# Patient Record
Sex: MALE | Race: WHITE | Employment: FULL TIME | URBAN - METROPOLITAN AREA
[De-identification: names, ages, dates, MRNs, and addresses within clinical notes are randomized per-mention and may not be internally consistent; named-entity substitution may affect disease eponyms.]

---

## 2020-12-09 RX ORDER — ATROPINE SULFATE 0.1 MG/ML
0.5 INJECTION INTRAVENOUS
Status: CANCELLED | OUTPATIENT
Start: 2020-12-09 | End: 2020-12-10

## 2020-12-09 RX ORDER — SODIUM CHLORIDE 0.9 % (FLUSH) 0.9 %
5-40 SYRINGE (ML) INJECTION AS NEEDED
Status: CANCELLED | OUTPATIENT
Start: 2020-12-09

## 2020-12-09 RX ORDER — DEXTROMETHORPHAN/PSEUDOEPHED 2.5-7.5/.8
1.2 DROPS ORAL
Status: CANCELLED | OUTPATIENT
Start: 2020-12-09

## 2020-12-09 RX ORDER — DIPHENHYDRAMINE HYDROCHLORIDE 50 MG/ML
50 INJECTION, SOLUTION INTRAMUSCULAR; INTRAVENOUS ONCE
Status: CANCELLED | OUTPATIENT
Start: 2020-12-09 | End: 2020-12-09

## 2020-12-09 RX ORDER — SODIUM CHLORIDE 0.9 % (FLUSH) 0.9 %
5-40 SYRINGE (ML) INJECTION EVERY 8 HOURS
Status: CANCELLED | OUTPATIENT
Start: 2020-12-09

## 2020-12-09 RX ORDER — EPINEPHRINE 0.1 MG/ML
1 INJECTION INTRACARDIAC; INTRAVENOUS
Status: CANCELLED | OUTPATIENT
Start: 2020-12-09 | End: 2020-12-10

## 2020-12-11 ENCOUNTER — HOSPITAL ENCOUNTER (OUTPATIENT)
Dept: PREADMISSION TESTING | Age: 55
Discharge: HOME | End: 2020-12-11
Payer: OTHER GOVERNMENT

## 2020-12-11 PROCEDURE — 87635 SARS-COV-2 COVID-19 AMP PRB: CPT

## 2020-12-13 LAB — SARS-COV-2, COV2NT: NOT DETECTED

## 2020-12-15 ENCOUNTER — HOSPITAL ENCOUNTER (OUTPATIENT)
Age: 55
Setting detail: OUTPATIENT SURGERY
Discharge: HOME | End: 2020-12-15
Attending: INTERNAL MEDICINE | Admitting: INTERNAL MEDICINE
Payer: OTHER GOVERNMENT

## 2020-12-15 VITALS
TEMPERATURE: 97.7 F | HEIGHT: 75 IN | SYSTOLIC BLOOD PRESSURE: 120 MMHG | RESPIRATION RATE: 16 BRPM | HEART RATE: 63 BPM | WEIGHT: 256.7 LBS | OXYGEN SATURATION: 97 % | BODY MASS INDEX: 31.92 KG/M2 | DIASTOLIC BLOOD PRESSURE: 94 MMHG

## 2020-12-15 PROCEDURE — 77030013991 HC SNR POLYP ENDOSC BSC -A: Performed by: INTERNAL MEDICINE

## 2020-12-15 PROCEDURE — 74011250636 HC RX REV CODE- 250/636: Performed by: INTERNAL MEDICINE

## 2020-12-15 PROCEDURE — 76040000008: Performed by: INTERNAL MEDICINE

## 2020-12-15 PROCEDURE — G0500 MOD SEDAT ENDO SERVICE >5YRS: HCPCS | Performed by: INTERNAL MEDICINE

## 2020-12-15 PROCEDURE — 77030040361 HC SLV COMPR DVT MDII -B: Performed by: INTERNAL MEDICINE

## 2020-12-15 PROCEDURE — 99153 MOD SED SAME PHYS/QHP EA: CPT | Performed by: INTERNAL MEDICINE

## 2020-12-15 PROCEDURE — 88305 TISSUE EXAM BY PATHOLOGIST: CPT

## 2020-12-15 PROCEDURE — 2709999900 HC NON-CHARGEABLE SUPPLY: Performed by: INTERNAL MEDICINE

## 2020-12-15 PROCEDURE — 77030039961 HC KT CUST COLON BSC -D: Performed by: INTERNAL MEDICINE

## 2020-12-15 RX ORDER — FLUMAZENIL 0.1 MG/ML
0.2 INJECTION INTRAVENOUS
Status: ACTIVE | OUTPATIENT
Start: 2020-12-15 | End: 2020-12-15

## 2020-12-15 RX ORDER — FENTANYL CITRATE 50 UG/ML
100 INJECTION, SOLUTION INTRAMUSCULAR; INTRAVENOUS
Status: ACTIVE | OUTPATIENT
Start: 2020-12-15 | End: 2020-12-15

## 2020-12-15 RX ORDER — DICLOFENAC SODIUM 10 MG/G
GEL TOPICAL 4 TIMES DAILY
COMMUNITY
End: 2021-11-24

## 2020-12-15 RX ORDER — SODIUM CHLORIDE 9 MG/ML
1000 INJECTION, SOLUTION INTRAVENOUS CONTINUOUS
Status: DISPENSED | OUTPATIENT
Start: 2020-12-15 | End: 2020-12-15

## 2020-12-15 RX ORDER — NALOXONE HYDROCHLORIDE 0.4 MG/ML
0.4 INJECTION, SOLUTION INTRAMUSCULAR; INTRAVENOUS; SUBCUTANEOUS
Status: ACTIVE | OUTPATIENT
Start: 2020-12-15 | End: 2020-12-15

## 2020-12-15 RX ORDER — FEXOFENADINE HCL AND PSEUDOEPHEDRINE HCI 60; 120 MG/1; MG/1
1 TABLET, EXTENDED RELEASE ORAL
COMMUNITY

## 2020-12-15 RX ORDER — IBUPROFEN 200 MG
TABLET ORAL
COMMUNITY
End: 2021-11-24

## 2020-12-15 RX ORDER — OMEPRAZOLE 20 MG/1
20 TABLET, DELAYED RELEASE ORAL
COMMUNITY

## 2020-12-15 RX ORDER — MIDAZOLAM HYDROCHLORIDE 1 MG/ML
.25-5 INJECTION, SOLUTION INTRAMUSCULAR; INTRAVENOUS
Status: ACTIVE | OUTPATIENT
Start: 2020-12-15 | End: 2020-12-15

## 2020-12-15 RX ADMIN — SODIUM CHLORIDE 1000 ML: 900 INJECTION, SOLUTION INTRAVENOUS at 11:12

## 2020-12-15 NOTE — H&P
Assessment/Plan  # Detail Type Description    1. Assessment Encounter for screening for cancer of colon (Z12.11). Patient Plan First colonoscopy ordered with Dr. Jose Menendez, with [facility]. Stressed importance of following all bowel preparation instructions. Explained the procedure to the patient including all risks and benefits. These risks consist of missed lesions on exam, bleeding, and bowel perforation with possible need for admission to the hospital, and in the most extensive of  circumstances, the patient may require surgery. Plan Orders Further diagnostic evaluations ordered today include(s) DIAGNOSTIC COLONOSCOPY to be performed. 2. Assessment Family hx of colonic polyps (Z83.71). Patient Plan Father had history of colon polyps. This 48year old male presents for Colon Cancer Screening. History of Present Illness:  1. Colon Cancer Screening   No prior screening. Denies risk factors. Pertinent negatives include abdominal pain, change in bowel habits, change in stool caliber, constipation, decreased appetite, diarrhea, melena, nausea, rectal bleeding, vomiting, weight gain and weight loss. Additional information: No family history of colon cancer and BM 2x daily. Father had history of colon polyps. PROBLEM LIST:     Problem Description Onset Date Chronic Clinical Status Notes   Current tear of medial cartilage AND/OR meniscus of knee 08/28/2015 N     Knee pain 08/28/2015 N     Osteoarthritis of knee 09/25/2013 Y  b/l knee OA s/p steroid and synvisc injections.              PAST MEDICAL/SURGICAL HISTORY   (Detailed)    Disease/disorder Onset Date Management Date Comments     LASIK 8796    R-knee plica removed  Knee surgery     TMJ - Temporomandibular joint disorder       BPH - benign prostatic hyperplasia       Elevated PSA       Lumbar radiculopathy       LBP - Low back pain       OA - Osteoarthritis       GERD       Family history of colonic polyps Family History  (Detailed)  Relationship Family Member Name  Age at Death Condition Onset Age Cause of Death       Family history of Hypertension  N         N       Family history of cancer, skin  N   Father    Lymphoma  N   Father    Colon polyps  N   Father    Stroke  N   Mother    Diabetes mellitus  N         Social History:  (Detailed)  Tobacco use reviewed. Preferred language is [place]. MARITAL STATUS/FAMILY/SOCIAL SUPPORT  Marital status:    CHILDREN  Does not have children. Smoking status: Never smoker. TOBACCO SCREENING:  Patient has never used tobacco. Patient has not used tobacco in the last 30 days. Patient has not used smokeless tobacco in the last 30 days. SMOKING STATUS  Type Smoking Status Usage Per Day Years Used Pack Years Total Pack Years    Never smoker         VAPING USE  Screened for vaping? No    TOBACCO/VAPING EXPOSURE  No passive smoke exposure. ALCOHOL  There is a history of alcohol use. Type: Beer. consumed rarely. CAFFEINE  The patient uses caffeine: soda - 1-2 cups a day. LIFESTYLE  Moderate activity level. Health club member. Exercise includes weights. Exercises 3-4 times a week. DIET  , high cholesterol. SLEEP PATTERNS  Patient has no changes to sleep patterns. Medications (active prior to today)  Medication Name Sig Description Start Date Stop Date Refilled Rx Elsewhere   multivitamin capsule take 1 capsule by oral route  every day 2013   N   omeprazole 40 mg capsule,delayed release take 1 capsule by oral route  every day before a meal as needed 2019   N   Voltaren 1 % topical gel apply (2G)  by topical route 4 times every day to the affected area(s) as needed 2019   N   ibuprofen 800 mg tablet take 1 tablet by oral route 3 times every day with food as needed 2019   N     Patient Status   Completed with information received for patient in a summary of care record.      Medication Reconciliation  Medications reconciled today. Medication Reviewed  Adherence Medication Name Sig Desc Elsewhere Status   taking as directed multivitamin capsule take 1 capsule by oral route  every day N Verified   taking as directed omeprazole 40 mg capsule,delayed release take 1 capsule by oral route  every day before a meal as needed N Verified   taking as directed Voltaren 1 % topical gel apply (2G)  by topical route 4 times every day to the affected area(s) as needed N Verified   taking as directed ibuprofen 800 mg tablet take 1 tablet by oral route 3 times every day with food as needed N Verified     Medications (Added, Continued or Stopped today)  Start Date Medication Directions PRN Status PRN Reason Instruction Stop Date   01/23/2019 ibuprofen 800 mg tablet take 1 tablet by oral route 3 times every day with food as needed Y      12/02/2020 MoviPrep 100 gram-7.5 gram-2.691 gram oral powder packet take by oral route as directed per package instructions N      02/14/2013 multivitamin capsule take 1 capsule by oral route  every day N      01/23/2019 omeprazole 40 mg capsule,delayed release take 1 capsule by oral route  every day before a meal as needed Y      01/23/2019 Voltaren 1 % topical gel apply (2G)  by topical route 4 times every day to the affected area(s) as needed Y        Allergies:  Ingredient Reaction (Severity) Medication Name Comment   NO KNOWN ALLERGIES      Reviewed, no changes. ORDERS:  Status Lab Order Time Frame Comments   ordered DIAGNOSTIC COLONOSCOPY       Review of System  System Neg/Pos Details   Constitutional Negative Fever, Weight gain and Weight loss. ENMT Negative Sinus Infection. Eyes Negative Double vision. Respiratory Negative Asthma, Chronic cough and Dyspnea. Cardio Negative Chest pain, Edema and Irregular heartbeat/palpitations.    GI Negative Abdominal pain, Change in bowel habits, Change in stool caliber, Constipation, Decreased appetite, Diarrhea, Dysphagia, Heartburn, Hematemesis, Hematochezia, Melena, Nausea, Rectal bleeding, Reflux and Vomiting.  Negative Dysuria and Hematuria. Endocrine Negative Cold intolerance and Heat intolerance. Neuro Negative Dizziness, Headache, Numbness and Tremors. Psych Negative Anxiety, Depression and Increased stress. Integumentary Negative Hives, Pruritus and Rash. MS Negative Back pain, Joint pain and Myalgia. Hema/Lymph Negative Easy bleeding, Easy bruising and Lymphadenopathy. Allergic/Immuno Negative Food allergies and Immunosuppression. Vital Signs   Height  Time ft in cm Last Measured Height Position   11:18 AM 6.0 3.00 190.50 12/02/2020      MAP (Calculated)  Arterial Line 1 BP (mmHg)  BP Patient Position  Resp  SpO2  O2 Device  O2 Flow Rate (L/min)  Pre/Post Ductal  Weight        12/15/20 1220  --  64  148/91Abnormal    110  --  --  18  99 %  Room air  --  --  --    12/15/20 1106  98.2 °F (36.8 °C)  70  139/91Abnormal    107  --  --  18  99 %  Room air  --  --  116.4 kg (256 lb 11.2 oz)        PHYSICAL EXAM:  Exam Findings Details   Constitutional Normal Well developed. Eyes Normal Conjunctiva - Right: Normal, Left: Normal. Sclera - Right: Normal, Left: Normal.   Nasopharynx Normal Lips/teeth/gums - Normal.   Neck Exam Normal Inspection - Normal.   Respiratory Normal Inspection - Normal.   Cardiovascular Normal Regular rate and rhythm. No murmurs, gallops, or rubs. Vascular Normal Pulses - Brachial: Normal.   Abdomen Normal Inspection - Normal. Appliance(s) - None. Auscultation - Normal. Percussion - Normal. Anterior palpation - No guarding. No abdominal tenderness. No hepatic enlargement. No hernia. No ascites. Skin Normal Inspection - Normal.   Musculoskeletal Normal Hands/Wrist - Right: Normal, Left: Normal.   Extremity Normal No edema. Neurological Normal Fine motor skills - Normal.   Psychiatric Normal Orientation - Oriented to time, place, person & situation. Appropriate mood and affect. Active Patient Care Team Members    Name Contact Agency Type Support Role Relationship Active Date Inactive Date Specialty   Ifeanyichukwu Ani   encounter provider    Urology   Darcy Herrera   Patient provider PCP   Internal Medicine   Malu James   Emergency Contact Spouse        No change in H&P

## 2020-12-15 NOTE — DISCHARGE INSTRUCTIONS
Huang Clay  [id number]  1965    COLON DISCHARGE INSTRUCTIONS    Discomfort:  Redness at IV site- apply warm compress to area; if redness or soreness persist- contact your physician  There may be a slight amount of blood passed from the rectum  Gaseous discomfort- walking, belching will help relieve any discomfort  You may not operate a vehicle til the next day. You may not engage in an occupation involving machinery or appliances til the next day. You may not drink alcoholic beverages til the next day. DIET:   High fiber diet. ACTIVITY:  You may not  resume your normal daily activities til the next day. it is recommended that you spend the remainder of the day resting -  avoid any strenuous activity. CALL M.D.  IF ANY SIGN OF:   Increasing pain, nausea, vomiting  Abdominal distension (swelling)  New increased bleeding (oral or rectal)  Fever (chills)  Pain in chest area  Bloody discharge from nose or mouth  Shortness of breath    You may not  take any Advil, Aspirin, Ibuprofen, Motrin, Aleve, or Goodys for 7 days, ONLY  Tylenol as needed for pain. Post procedure diagnosis:  HEMORROIDS; POLYP    Follow-up Instructions: Your follow up colonoscopy will be in 7 years. We will notify you the results of your biopsy by letter within 2 weeks. Lyubov Toscano MD  December 15, 2020       DISCHARGE SUMMARY from Nurse    The following personal items collected during your admission are returned to you:   Dental Appliance: Dental Appliances: None  Vision: Visual Aid:  At bedside  Hearing Aid:    Jewelry:    Clothing:    Other Valuables:    Valuables sent to safe:              PATIENT INSTRUCTIONS:    After general anesthesia or intravenous sedation, for 24 hours or while taking prescription Narcotics:  · Limit your activities  · Do not drive and operate hazardous machinery  · Do not make important personal or business decisions  · Do  not drink alcoholic beverages  · If you have not urinated within 8 hours after discharge, please contact your surgeon on call. Report the following to your surgeon:  · Excessive pain, swelling, redness or odor of or around the surgical area  · Temperature over 100.5  · Nausea and vomiting lasting longer than 4 hours or if unable to take medications  · Any signs of decreased circulation or nerve impairment to extremity: change in color, persistent  numbness, tingling, coldness or increase pain  · Any questions      No orders of the defined types were placed in this encounter. What to do at Home:  Recommended activity: as above,     If you experience any of the following symptoms as above, please follow up with Dr. Nelia Reed. *  Please give a list of your current medications to your Primary Care Provider. *  Please update this list whenever your medications are discontinued, doses are      changed, or new medications (including over-the-counter products) are added. *  Please carry medication information at all times in case of emergency situations. These are general instructions for a healthy lifestyle:    No smoking/ No tobacco products/ Avoid exposure to second hand smoke    Surgeon General's Warning:  Quitting smoking now greatly reduces serious risk to your health. Obesity, smoking, and sedentary lifestyle greatly increases your risk for illness    A healthy diet, regular physical exercise & weight monitoring are important for maintaining a healthy lifestyle    You may be retaining fluid if you have a history of heart failure or if you experience any of the following symptoms:  Weight gain of 3 pounds or more overnight or 5 pounds in a week, increased swelling in our hands or feet or shortness of breath while lying flat in bed. Please call your doctor as soon as you notice any of these symptoms; do not wait until your next office visit.     Recognize signs and symptoms of STROKE:    F-face looks uneven    A-arms unable to move or move unevenly    S-speech slurred or non-existent    T-time-call 911 as soon as signs and symptoms begin-DO NOT go       Back to bed or wait to see if you get better-TIME IS BRAIN. The discharge information has been reviewed with the patient and parent. The patient and parent verbalized understanding. Warning Signs of HEART ATTACK     Call 911 if you have these symptoms:   Chest discomfort. Most heart attacks involve discomfort in the center of the chest that lasts more than a few minutes, or that goes away and comes back. It can feel like uncomfortable pressure, squeezing, fullness, or pain.  Discomfort in other areas of the upper body. Symptoms can include pain or discomfort in one or both arms, the back, neck, jaw, or stomach.  Shortness of breath with or without chest discomfort.  Other signs may include breaking out in a cold sweat, nausea, or lightheadedness. Don't wait more than five minutes to call 911 - MINUTES MATTER! Fast action can save your life. Calling 911 is almost always the fastest way to get lifesaving treatment. Emergency Medical Services staff can begin treatment when they arrive -- up to an hour sooner than if someone gets to the hospital by car. The discharge information has been reviewed with the patient and caregiver. The patient and caregiver verbalized understanding. Discharge medications reviewed with the patient and guardian and appropriate educational materials and side effects teaching were provided.     Patient armband removed and shredded

## 2020-12-15 NOTE — PROCEDURES
[facility]  Colonoscopy Procedure Report  _______________________________________________________  Patient: Juana Baeza                                        Attending Physician: Willian Thrasher MD    Patient ID: [id number]                                    Referring Physician: Yoselin Arthur DO    Exam Date: 12/15/2020      Introduction: A  48 y.o. male patient, presents for inpatient Colonoscopy    Indications: Screening for colon cancer average risk and asymptomatic. No prior screening. Denies risk factors. Pertinent negatives include abdominal pain, change in bowel habits, change in stool caliber, constipation, decreased appetite, diarrhea, melena, nausea, rectal bleeding, vomiting, weight gain and weight loss. Additional information: No family history of colon cancer and BM 2x daily. Father had history of colon polyps. Consent: The benefits, risks, and alternatives to the procedure were discussed and informed consent was obtained from the patient. Preparation: EKG, pulse, pulse oximetry and blood pressure were monitored throughout the procedure. ASA Classification: Class I- . The heart is an S1-S2 and regular heart rate and rhythm. Lungs are clear to auscultation and percussion. Abdomen is soft, nondistended, and nontender. Mental Status: awake, alert, and oriented to person, place, and time    Medications:  · Fentanyl 100 mcg IV before procedure. · Versed 5 mg IV throughout the procedure. Rectal Exam: Normal Rectal Exam. No Blood. Prostate not enlarged. Pathology Specimens:  2    Procedure: The colonoscope was passed with ease through the anus under direct visualization and advanced to the cecum and 4 cm inside the terminal ileum. The patient required positioning on the back and some external counter pressure to aid in the passage of the scope. The scope was withdrawn and the mucosa was carefully examined. The quality of the preparation was excellent.  The views were excellent. The patient's toleration of the procedure was excellent. The exam was done twice to the cecum. Retroflexion is made in the ascending colon. Total time is 33 minutes and withdrawal time is 29 minutes. Findings:    Rectum:   Small internal hemorrhoids. Sigmoid:   normal   Descending Colon:   7 mm sessile polyp in the splenic flexure, cold snared  Transverse Colon:   Normal   Ascending Colon:   4 mm sessile in the hepatic flexure cold snared and a 3 mm flat polyp in the proximal ascending colon, cold snared  Cecum:   Normal   Terminal Ileum:   Normal      Unplanned Events: There were no unplanned events. Estimated Blood Loss: None  Impressions: Small internal hemorrhoids. Slightly redundant colon. 7 mm sessile polyp in the splenic flexure, cold snared. 4 mm sessile in the hepatic flexure cold snared and a 3 mm flat polyp barely visible in the proximal ascending colon, cold snared. Normal Mucosa. No blood, diverticulum or AVM found. Complications: None; patient tolerated the procedure well. Recommendations:  · Discharge home when standard parameters are met. · Resume a high fiber diet. · Resume own medications. Avoid all NSAID's for  · Colonoscopy recommendation in 7 years.   · Take Miralax and/ or Colace 100 mg on regular basis if constipated    Procedure Codes:    · Hemant Kaufman [[id number]]    Endoscope Information:  Model Number(s)    [id number]   Assistant: None  Signed By: Marylen Ax, MD Date: 12/15/2020

## 2021-10-27 ENCOUNTER — TRANSCRIBE ORDER (OUTPATIENT)
Dept: REGISTRATION | Age: 56
End: 2021-10-27

## 2021-10-27 ENCOUNTER — HOSPITAL ENCOUNTER (OUTPATIENT)
Dept: PREADMISSION TESTING | Age: 56
Discharge: HOME | End: 2021-10-27
Payer: OTHER GOVERNMENT

## 2021-10-27 DIAGNOSIS — Z01.812 BLOOD TESTS PRIOR TO TREATMENT OR PROCEDURE: ICD-10-CM

## 2021-10-27 DIAGNOSIS — M17.11 OSTEOARTHRITIS OF RIGHT KNEE: ICD-10-CM

## 2021-10-27 DIAGNOSIS — M17.11 OSTEOARTHRITIS OF RIGHT KNEE: Primary | ICD-10-CM

## 2021-10-27 LAB
ALBUMIN SERPL-MCNC: 3.9 G/DL (ref 3.4–5)
ALBUMIN/GLOB SERPL: 1.4 {RATIO} (ref 0.8–1.7)
ALP SERPL-CCNC: 67 U/L (ref 45–117)
ALT SERPL-CCNC: 33 U/L (ref 16–61)
ANION GAP SERPL CALC-SCNC: 2 MMOL/L (ref 3–18)
APPEARANCE UR: CLEAR
AST SERPL-CCNC: 18 U/L (ref 10–38)
ATRIAL RATE: 60 BPM
BACTERIA URNS QL MICRO: NEGATIVE /HPF
BILIRUB SERPL-MCNC: 0.7 MG/DL (ref 0.2–1)
BILIRUB UR QL: NEGATIVE
BUN SERPL-MCNC: 15 MG/DL (ref 7–18)
BUN/CREAT SERPL: 17 (ref 12–20)
CALCIUM SERPL-MCNC: 8.8 MG/DL (ref 8.5–10.1)
CALCULATED P AXIS, ECG09: 46 DEGREES
CALCULATED R AXIS, ECG10: -47 DEGREES
CALCULATED T AXIS, ECG11: 32 DEGREES
CHLORIDE SERPL-SCNC: 107 MMOL/L (ref 100–111)
CO2 SERPL-SCNC: 29 MMOL/L (ref 21–32)
COLOR UR: YELLOW
CREAT SERPL-MCNC: 0.89 MG/DL (ref 0.6–1.3)
DIAGNOSIS, 93000: NORMAL
EPITH CASTS URNS QL MICRO: ABNORMAL /LPF (ref 0–5)
ERYTHROCYTE [DISTWIDTH] IN BLOOD BY AUTOMATED COUNT: 12.1 % (ref 11.6–14.5)
GLOBULIN SER CALC-MCNC: 2.8 G/DL (ref 2–4)
GLUCOSE SERPL-MCNC: 102 MG/DL (ref 74–99)
GLUCOSE UR STRIP.AUTO-MCNC: NEGATIVE MG/DL
HCT VFR BLD AUTO: 45.7 % (ref 36–48)
HGB BLD-MCNC: 15.3 G/DL (ref 13–16)
HGB UR QL STRIP: NEGATIVE
KETONES UR QL STRIP.AUTO: NEGATIVE MG/DL
LEUKOCYTE ESTERASE UR QL STRIP.AUTO: ABNORMAL
MCH RBC QN AUTO: 29 PG (ref 24–34)
MCHC RBC AUTO-ENTMCNC: 33.5 G/DL (ref 31–37)
MCV RBC AUTO: 86.6 FL (ref 78–100)
MUCOUS THREADS URNS QL MICRO: ABNORMAL /LPF
NITRITE UR QL STRIP.AUTO: NEGATIVE
P-R INTERVAL, ECG05: 140 MS
PH UR STRIP: 7 [PH] (ref 5–8)
PLATELET # BLD AUTO: 241 K/UL (ref 135–420)
PMV BLD AUTO: 9.8 FL (ref 9.2–11.8)
POTASSIUM SERPL-SCNC: 4.7 MMOL/L (ref 3.5–5.5)
PROT SERPL-MCNC: 6.7 G/DL (ref 6.4–8.2)
PROT UR STRIP-MCNC: NEGATIVE MG/DL
Q-T INTERVAL, ECG07: 396 MS
QRS DURATION, ECG06: 92 MS
QTC CALCULATION (BEZET), ECG08: 396 MS
RBC # BLD AUTO: 5.28 M/UL (ref 4.35–5.65)
RBC #/AREA URNS HPF: ABNORMAL /HPF (ref 0–5)
SODIUM SERPL-SCNC: 138 MMOL/L (ref 136–145)
SP GR UR REFRACTOMETRY: 1.02 (ref 1–1.03)
UROBILINOGEN UR QL STRIP.AUTO: 0.2 EU/DL (ref 0.2–1)
VENTRICULAR RATE, ECG03: 60 BPM
WBC # BLD AUTO: 6.9 K/UL (ref 4.6–13.2)
WBC URNS QL MICRO: ABNORMAL /HPF (ref 0–5)

## 2021-10-27 PROCEDURE — 93005 ELECTROCARDIOGRAM TRACING: CPT

## 2021-10-27 PROCEDURE — 36415 COLL VENOUS BLD VENIPUNCTURE: CPT

## 2021-10-27 PROCEDURE — 80053 COMPREHEN METABOLIC PANEL: CPT

## 2021-10-27 PROCEDURE — 85027 COMPLETE CBC AUTOMATED: CPT

## 2021-10-27 PROCEDURE — 81001 URINALYSIS AUTO W/SCOPE: CPT

## 2021-10-27 PROCEDURE — 87086 URINE CULTURE/COLONY COUNT: CPT

## 2021-10-28 LAB
BACTERIA SPEC CULT: NORMAL
SERVICE CMNT-IMP: NORMAL
SERVICE CMNT-IMP: NORMAL

## 2021-11-08 NOTE — NURSE NAVIGATOR
Magaly Dill watched the joint seminar online and received a preoperative education booklet in anticipation of joint replacement surgery.      Orthopaedic Nurse Navigator

## 2021-11-15 ENCOUNTER — HOSPITAL ENCOUNTER (OUTPATIENT)
Dept: PREADMISSION TESTING | Age: 56
Discharge: HOME | End: 2021-11-15

## 2021-11-15 VITALS — BODY MASS INDEX: 31.46 KG/M2 | WEIGHT: 253 LBS | HEIGHT: 75 IN

## 2021-11-15 RX ORDER — BISMUTH SUBSALICYLATE 262 MG
1 TABLET,CHEWABLE ORAL DAILY
COMMUNITY
End: 2021-11-24

## 2021-11-15 RX ORDER — SODIUM CHLORIDE, SODIUM LACTATE, POTASSIUM CHLORIDE, CALCIUM CHLORIDE 600; 310; 30; 20 MG/100ML; MG/100ML; MG/100ML; MG/100ML
125 INJECTION, SOLUTION INTRAVENOUS CONTINUOUS
Status: CANCELLED | OUTPATIENT
Start: 2021-11-15

## 2021-11-15 RX ORDER — CETIRIZINE HCL 10 MG
TABLET ORAL DAILY
COMMUNITY

## 2021-11-15 RX ORDER — ASCORBIC ACID 500 MG
1000 TABLET ORAL DAILY
COMMUNITY
End: 2021-11-24

## 2021-11-15 RX ORDER — MELATONIN
DAILY
COMMUNITY

## 2021-11-15 NOTE — PERIOP NOTES
Leave all valuables at home or loved ones;to include wallets/purse, money/credit cards, electronics  such as laptops and tablets. If you want to have your prescriptions filled here, please have some form of payment with your . Please arrange for your transportation home Hold supplements 2 weeks prior to [facility]. Denies any prosthetics. Patient states family physician is aware of upcoming procedure/surgery. Stop nsaids 7 days prior to [facility]. Do not put any lotion, jewelry, makeup, fingernail or toenail polish; no wigs, no private piercings; no tictac,gum and mouthwash. Patient instructed to bring cpap machine in on day of procedure/surgery. Please be aware that due to unforeseen circumstances, delays may occur and your patience will be appreciated. If you ae scheduled to be discharged the same day, please plan to be with us for most of the day. If an inpatient, room assignments may be delayed as well. Our priority is to make you as comfortable as possible and to keep your family informed of your status when possible. Denies family history of anesthesia complications. Denies shortness of breath nor chest pain while climbing stairs. Pt aware: to come in for covid testing 11-19-21 prior to [facility]; to self quarantine (includes no visitors) after covid test done. No dnr  Preoperative Nutrition Screen (RICCI)   Patient's Age: 55 y.o. Patient's BMI: Estimated body mass index is 31.62 kg/m² as calculated from the following:    Height as of this encounter: 6' 3\" (1.905 m). Weight as of this encounter: 114.8 kg (253 lb). 1. Does the patient have a documented serum albumin less than 3.0 within the last 90 days? No = 0          2. Is patient's BMI less than 18.5 (or less than 20 if age over 72)? No = 0        3. Has the patient had an unplanned weight loss of 10% of body weight or more in the last 6 months? No = 0   4. Has the patient been eating less than 50% of their normal diet in the preceding week?  No = 0   RICCI Score (number of Yes responses), 0-4 0       Electronically signed by Yusra Yo RN on 11/15/21 at 10:43 AM

## 2021-11-22 PROBLEM — M17.11 PRIMARY OSTEOARTHRITIS OF RIGHT KNEE: Chronic | Status: ACTIVE | Noted: 2021-11-22

## 2021-11-22 PROBLEM — G47.30 SLEEP APNEA: Chronic | Status: ACTIVE | Noted: 2021-11-22

## 2021-11-22 NOTE — H&P
History and Physical        Patient: Michael Riggins               Sex: male          DOA: (Not on file)         YOB: 1965      Age:  55 y.o.        LOS:  LOS: 0 days        HPI:     Vonzella Boeck is a 50-year-old, right-handed, white male seen today as a new patient for evaluation and treatment of right severe medial tibiofemoral/moderate patellofemoral DJD. The patient has not gotten any better over the years. He had an original injury almost 30 years ago, had surgery done through the [facility], and has had issues with his knee ever since. He has gotten along pretty well until recently when he noticed more bowing and less ability to do the activities of daily living. He has had cortisone injections. He has had multiple medications. He has had therapy, and has not been able to keep up with his routine activities. Standing AP, tunnel, lateral, and sunrise views of the right knee were obtained and interpreted in the office and reveal severe medial tibiofemoral and moderate patellofemoral DJD; otherwise, no periosteal reaction, no medullary lesions, no osteopenia, well-aligned joint spaces, and no chondrolysis.       Past Medical History:   Diagnosis Date    Allergic rhinitis     Arthritis     Concussion     high school loc unknown    Enlarged prostate     GERD (gastroesophageal reflux disease)     Sleep apnea      cpap mild    Vertigo     crystals used       Past Surgical History:   Procedure Laterality Date    COLONOSCOPY N/A 12/15/2020    COLONOSCOPY; POLYPECTOMY performed by Tiffany Joseph MD at THE [facility] ENDOSCOPY    HX KNEE ARTHROSCOPY Right 1993    HX OTHER SURGICAL      moles removed       Family History   Problem Relation Age of Onset    Cancer Mother     Diabetes Mother     Cancer Father     Hypertension Father     Stroke Father     Dementia Father        Social History     Socioeconomic History    Marital status:    Tobacco Use    Smoking status: Never Smoker  Smokeless tobacco: Never Used   Vaping Use    Vaping Use: Never used   Substance and Sexual Activity    Alcohol use: Yes     Alcohol/week: 3.0 standard drinks     Types: 3 Glasses of wine per week    Drug use: Never       Prior to Admission medications    Medication Sig Start Date End Date Taking? Authorizing Provider   cetirizine (Aller-Tho) 10 mg tablet Take  by mouth daily. Provider, Historical   cholecalciferol (Vitamin D3) (1000 Units /25 mcg) tablet Take  by mouth daily. Provider, Historical   ascorbic acid, vitamin C, (Vitamin C) 500 mg tablet Take 1,000 mg by mouth daily. Provider, Historical   multivitamin (ONE A DAY) tablet Take 1 Tablet by mouth daily. Provider, Historical   omeprazole (PriLOSEC OTC) 20 mg tablet Take 20 mg by mouth daily as needed. Provider, Historical   diclofenac (Voltaren) 1 % gel Apply  to affected area four (4) times daily. Provider, Historical   ibuprofen (Motrin IB) 200 mg tablet Take  by mouth. Provider, Historical   fexofenadine-pseudoephedrine (Allegra-D 12 Hour)  mg Tb12 Take 1 Tablet by mouth every twelve (12) hours as needed. Provider, Historical       No Known Allergies    Review of Systems  GENERAL:  Patient has no signs of fever, chills or weight change. HEAD/ENTM:  Patient has no signs of headaches, dizziness, hearing loss, ringing in ears, sore throat/hoarseness, recent cold, double vision, blurred vision, itchy eyes, eye redness or eye discharge. NEUROLOGIC:  Patient has no signs of fainting, muscle weakness, numbness/tingling, loss of balance or seizure disorder. CARDIOVASCULAR:  Patient has no signs of chest pain, palpitations, rheumatic fever or heart murmur. RESPIRATORY:  Patient has no signs of chronic cough, wheezing, difficulty breathing, pain on breathing or shortness of breath.   GASTROINTESTINAL:  Patient has no signs of nausea/vomiting, difficulty swallowing, gas/bloating, indigestion, abdominal pain, diarrhea, bloody stools or hemorrhoids. GENITOURINARY:  Patient has no signs of blood in urine, painful urinating, burning sensation, bladder/kidney infection, frequent urinating or incontinence. MUSCULOSKELETAL: Patient presents with joint stiffness and swelling of feet. Patient has no signs of fracture/dislocation, sprain/strain, tendonitis, joint pain, rheumatoid disease or gout. INTEGUMENTARY:  Patient has no signs of rash/itching, psoriasis, Raynaud's phenomenon or varicose veins. EMOTIONAL:  Patient has no signs of anxiety, depression, bipolar disorder, memory loss or change in mood. Patient also presents with Enlarged Prostate. Physical Exam:      There were no vitals taken for this visit. Physical Exam:   Physical exam shows a healthy-appearing, 60-year-old, white male. The right knee has obvious genu varum. He has -5 degrees of extension to flexion beyond 90 degrees. He has pain on the medial joint line more than the lateral.  He has positive patellofemoral crepitation with mild patellar inhibition. The patient has a negative Lachman and has no valgus instability at zero degrees or 30 degrees. There is a negative posterior sag. There is no knee effusion. There is no swelling, ecchymosis, or wounds. The patient has no popliteal pain. The patient has a negative patellar grind, and a negative patellar apprehension test.  There is a negative Mushtaq Maneuver. There is no pain at the inferior pole of the patella or the tibial tubercle. The patient has 5/5 muscle strength with good quadriceps tone. The patient has good capillary refill with normal motor strength of the foot and ankle and normal light-touch sensation in the foot and lower leg. Assessment/Plan     Principal Problem:    Primary osteoarthritis of right knee (11/22/2021)    Active Problems:    Sleep apnea (11/22/2021)      Dr. Sanaz Martell asked him to see his  to plan for a right outpatient DePuy TKA.   The risks associated with right outpatient total knee arthroplasty using the subvastus approach was reviewed and all questions were answered. The benefits include possible earlier ambulation, better mobility, and quicker return of muscle function. The risks including pain, bleeding, infection, DVT, need for future surgeries amongst others are reviewed and questions are answered. The patient is going to be scheduled for outpatient TKA using the Pickie knee replacement system. We have given the patient Keflex for antibiotic prophylaxis and also a prescription for Roxicodone for postoperative pain management. We will use aspirin 81mg twice daily for DVT prophylaxis. The patient will also receive an IV dose of antibiotics preoperatively. The patient will be discharged the same day to home health physical therapy. The patient was counseled on the importance of ice, elevation, muscle stretching and range of motion exercises. The patient will follow up two weeks postoperatively. We also discussed doing a partial knee replacement as well. He is going to pay attention to his symptom complex, but right now his symptoms are pretty global to consider a medial UKA. If he finds that he meets the criteria symptom-banks, Dr. January Rosario have told him to please call and we can switch his procedure type.

## 2021-11-23 ENCOUNTER — ANESTHESIA EVENT (OUTPATIENT)
Dept: SURGERY | Age: 56
End: 2021-11-23
Payer: OTHER GOVERNMENT

## 2021-11-23 RX ORDER — SODIUM CHLORIDE 0.9 % (FLUSH) 0.9 %
5-40 SYRINGE (ML) INJECTION AS NEEDED
Status: CANCELLED | OUTPATIENT
Start: 2021-11-23

## 2021-11-23 RX ORDER — SODIUM CHLORIDE 0.9 % (FLUSH) 0.9 %
5-40 SYRINGE (ML) INJECTION EVERY 8 HOURS
Status: CANCELLED | OUTPATIENT
Start: 2021-11-23

## 2021-11-24 ENCOUNTER — ANESTHESIA (OUTPATIENT)
Dept: SURGERY | Age: 56
End: 2021-11-24
Payer: OTHER GOVERNMENT

## 2021-11-24 ENCOUNTER — HOSPITAL ENCOUNTER (OUTPATIENT)
Age: 56
Discharge: HOME HEALTH | End: 2021-11-24
Attending: ORTHOPAEDIC SURGERY | Admitting: ORTHOPAEDIC SURGERY
Payer: OTHER GOVERNMENT

## 2021-11-24 VITALS
HEART RATE: 70 BPM | DIASTOLIC BLOOD PRESSURE: 70 MMHG | OXYGEN SATURATION: 100 % | RESPIRATION RATE: 16 BRPM | BODY MASS INDEX: 32.41 KG/M2 | WEIGHT: 260.7 LBS | TEMPERATURE: 96.7 F | SYSTOLIC BLOOD PRESSURE: 139 MMHG | HEIGHT: 75 IN

## 2021-11-24 PROBLEM — M17.11 RIGHT KNEE DJD: Status: ACTIVE | Noted: 2021-11-24

## 2021-11-24 LAB
ABO + RH BLD: NORMAL
BLOOD GROUP ANTIBODIES SERPL: NORMAL
SPECIMEN EXP DATE BLD: NORMAL

## 2021-11-24 PROCEDURE — C1776 JOINT DEVICE (IMPLANTABLE): HCPCS | Performed by: ORTHOPAEDIC SURGERY

## 2021-11-24 PROCEDURE — 76942 ECHO GUIDE FOR BIOPSY: CPT

## 2021-11-24 PROCEDURE — 77030003666 HC NDL SPINAL BD -A: Performed by: ORTHOPAEDIC SURGERY

## 2021-11-24 PROCEDURE — 76210000026 HC REC RM PH II 1 TO 1.5 HR: Performed by: ORTHOPAEDIC SURGERY

## 2021-11-24 PROCEDURE — 76010000153 HC OR TIME 1.5 TO 2 HR: Performed by: ORTHOPAEDIC SURGERY

## 2021-11-24 PROCEDURE — 86901 BLOOD TYPING SEROLOGIC RH(D): CPT

## 2021-11-24 PROCEDURE — 74011000250 HC RX REV CODE- 250: Performed by: ORTHOPAEDIC SURGERY

## 2021-11-24 PROCEDURE — 77030027138 HC INCENT SPIROMETER -A: Performed by: ORTHOPAEDIC SURGERY

## 2021-11-24 PROCEDURE — 74011250636 HC RX REV CODE- 250/636: Performed by: PHYSICIAN ASSISTANT

## 2021-11-24 PROCEDURE — 77030034479 HC ADH SKN CLSR PRINEO J&J -B: Performed by: ORTHOPAEDIC SURGERY

## 2021-11-24 PROCEDURE — 76060000034 HC ANESTHESIA 1.5 TO 2 HR: Performed by: ORTHOPAEDIC SURGERY

## 2021-11-24 PROCEDURE — 2709999900 HC NON-CHARGEABLE SUPPLY: Performed by: ORTHOPAEDIC SURGERY

## 2021-11-24 PROCEDURE — 97116 GAIT TRAINING THERAPY: CPT

## 2021-11-24 PROCEDURE — 74011250636 HC RX REV CODE- 250/636: Performed by: NURSE ANESTHETIST, CERTIFIED REGISTERED

## 2021-11-24 PROCEDURE — 77030031139 HC SUT VCRL2 J&J -A: Performed by: ORTHOPAEDIC SURGERY

## 2021-11-24 PROCEDURE — 77030020782 HC GWN BAIR PAWS FLX 3M -B: Performed by: ORTHOPAEDIC SURGERY

## 2021-11-24 PROCEDURE — 64447 NJX AA&/STRD FEMORAL NRV IMG: CPT | Performed by: ANESTHESIOLOGY

## 2021-11-24 PROCEDURE — 74011250636 HC RX REV CODE- 250/636: Performed by: ORTHOPAEDIC SURGERY

## 2021-11-24 PROCEDURE — 97161 PT EVAL LOW COMPLEX 20 MIN: CPT

## 2021-11-24 PROCEDURE — 36415 COLL VENOUS BLD VENIPUNCTURE: CPT

## 2021-11-24 PROCEDURE — 77030002934 HC SUT MCRYL J&J -B: Performed by: ORTHOPAEDIC SURGERY

## 2021-11-24 PROCEDURE — 74011250636 HC RX REV CODE- 250/636: Performed by: ANESTHESIOLOGY

## 2021-11-24 PROCEDURE — 77030040361 HC SLV COMPR DVT MDII -B: Performed by: ORTHOPAEDIC SURGERY

## 2021-11-24 PROCEDURE — 76210000006 HC OR PH I REC 0.5 TO 1 HR: Performed by: ORTHOPAEDIC SURGERY

## 2021-11-24 PROCEDURE — 77030033263 HC DRSG MEPILEX 16-48IN BORD MOLN -B: Performed by: ORTHOPAEDIC SURGERY

## 2021-11-24 PROCEDURE — 77030012508 HC MSK AIRWY LMA AMBU -A: Performed by: ANESTHESIOLOGY

## 2021-11-24 PROCEDURE — 77030013708 HC HNDPC SUC IRR PULS STRY –B: Performed by: ORTHOPAEDIC SURGERY

## 2021-11-24 PROCEDURE — 74011000250 HC RX REV CODE- 250: Performed by: NURSE ANESTHETIST, CERTIFIED REGISTERED

## 2021-11-24 PROCEDURE — 76942 ECHO GUIDE FOR BIOPSY: CPT | Performed by: ORTHOPAEDIC SURGERY

## 2021-11-24 PROCEDURE — 74011000258 HC RX REV CODE- 258: Performed by: ORTHOPAEDIC SURGERY

## 2021-11-24 PROCEDURE — 97165 OT EVAL LOW COMPLEX 30 MIN: CPT

## 2021-11-24 PROCEDURE — 77030011628: Performed by: ORTHOPAEDIC SURGERY

## 2021-11-24 PROCEDURE — 77030038010: Performed by: ORTHOPAEDIC SURGERY

## 2021-11-24 PROCEDURE — L1830 KO IMMOB CANVAS LONG PRE OTS: HCPCS | Performed by: ORTHOPAEDIC SURGERY

## 2021-11-24 PROCEDURE — C1713 ANCHOR/SCREW BN/BN,TIS/BN: HCPCS | Performed by: ORTHOPAEDIC SURGERY

## 2021-11-24 PROCEDURE — 74011250637 HC RX REV CODE- 250/637: Performed by: PHYSICIAN ASSISTANT

## 2021-11-24 PROCEDURE — 97535 SELF CARE MNGMENT TRAINING: CPT

## 2021-11-24 PROCEDURE — 77030034694 HC SCPL CANADY PLSM DISP USMD -E: Performed by: ORTHOPAEDIC SURGERY

## 2021-11-24 PROCEDURE — 77030016060 HC NDL NRV BLK TELE -A: Performed by: ANESTHESIOLOGY

## 2021-11-24 PROCEDURE — 74011000250 HC RX REV CODE- 250: Performed by: ANESTHESIOLOGY

## 2021-11-24 DEVICE — CEMENT BNE 40GM FULL DOSE PMMA W/O ANTIBIO HI VISC N RADPQ: Type: IMPLANTABLE DEVICE | Site: KNEE | Status: FUNCTIONAL

## 2021-11-24 DEVICE — ATTUNE PATELLA MEDIALIZED ANATOMIC 41MM CEMENTED AOX
Type: IMPLANTABLE DEVICE | Site: KNEE | Status: FUNCTIONAL
Brand: ATTUNE

## 2021-11-24 DEVICE — BASEPLATE TIB SZ 8 CEM ROT PLATFRM KNEE SYS S + ATTUNE: Type: IMPLANTABLE DEVICE | Site: KNEE | Status: FUNCTIONAL

## 2021-11-24 DEVICE — IMPLANTABLE DEVICE: Type: IMPLANTABLE DEVICE | Status: FUNCTIONAL

## 2021-11-24 DEVICE — KNEE K1 TOT HEMI STD CEM IMPL CAPPED SYNTHES: Type: IMPLANTABLE DEVICE | Site: KNEE | Status: FUNCTIONAL

## 2021-11-24 RX ORDER — MAGNESIUM SULFATE 100 %
4 CRYSTALS MISCELLANEOUS AS NEEDED
Status: DISCONTINUED | OUTPATIENT
Start: 2021-11-24 | End: 2021-11-24 | Stop reason: HOSPADM

## 2021-11-24 RX ORDER — PROPOFOL 10 MG/ML
INJECTION, EMULSION INTRAVENOUS AS NEEDED
Status: DISCONTINUED | OUTPATIENT
Start: 2021-11-24 | End: 2021-11-24 | Stop reason: HOSPADM

## 2021-11-24 RX ORDER — ACETAMINOPHEN 500 MG
500 TABLET ORAL
COMMUNITY

## 2021-11-24 RX ORDER — TRANEXAMIC ACID 10 MG/ML
1 INJECTION, SOLUTION INTRAVENOUS SEE ADMIN INSTRUCTIONS
Status: COMPLETED | OUTPATIENT
Start: 2021-11-24 | End: 2021-11-24

## 2021-11-24 RX ORDER — CEFAZOLIN SODIUM/WATER 2 G/20 ML
2 SYRINGE (ML) INTRAVENOUS ONCE
Status: COMPLETED | OUTPATIENT
Start: 2021-11-24 | End: 2021-11-24

## 2021-11-24 RX ORDER — ACETAMINOPHEN 500 MG
1000 TABLET ORAL ONCE
Status: COMPLETED | OUTPATIENT
Start: 2021-11-24 | End: 2021-11-24

## 2021-11-24 RX ORDER — NALOXONE HYDROCHLORIDE 0.4 MG/ML
0.1 INJECTION, SOLUTION INTRAMUSCULAR; INTRAVENOUS; SUBCUTANEOUS AS NEEDED
Status: DISCONTINUED | OUTPATIENT
Start: 2021-11-24 | End: 2021-11-24 | Stop reason: HOSPADM

## 2021-11-24 RX ORDER — HYDROMORPHONE HYDROCHLORIDE 1 MG/ML
0.5 INJECTION, SOLUTION INTRAMUSCULAR; INTRAVENOUS; SUBCUTANEOUS
Status: DISCONTINUED | OUTPATIENT
Start: 2021-11-24 | End: 2021-11-24 | Stop reason: HOSPADM

## 2021-11-24 RX ORDER — DEXAMETHASONE SODIUM PHOSPHATE 4 MG/ML
8 INJECTION, SOLUTION INTRA-ARTICULAR; INTRALESIONAL; INTRAMUSCULAR; INTRAVENOUS; SOFT TISSUE ONCE
Status: COMPLETED | OUTPATIENT
Start: 2021-11-24 | End: 2021-11-24

## 2021-11-24 RX ORDER — CEPHALEXIN 500 MG/1
500 CAPSULE ORAL 4 TIMES DAILY
Qty: 4 CAPSULE | Refills: 0 | Status: SHIPPED
Start: 2021-11-24 | End: 2021-11-25

## 2021-11-24 RX ORDER — SODIUM CHLORIDE, SODIUM LACTATE, POTASSIUM CHLORIDE, CALCIUM CHLORIDE 600; 310; 30; 20 MG/100ML; MG/100ML; MG/100ML; MG/100ML
125 INJECTION, SOLUTION INTRAVENOUS CONTINUOUS
Status: DISCONTINUED | OUTPATIENT
Start: 2021-11-24 | End: 2021-11-24 | Stop reason: HOSPADM

## 2021-11-24 RX ORDER — FLUMAZENIL 0.1 MG/ML
0.2 INJECTION INTRAVENOUS
Status: DISCONTINUED | OUTPATIENT
Start: 2021-11-24 | End: 2021-11-24 | Stop reason: HOSPADM

## 2021-11-24 RX ORDER — SODIUM CHLORIDE 0.9 % (FLUSH) 0.9 %
5-40 SYRINGE (ML) INJECTION AS NEEDED
Status: DISCONTINUED | OUTPATIENT
Start: 2021-11-24 | End: 2021-11-24 | Stop reason: HOSPADM

## 2021-11-24 RX ORDER — ONDANSETRON 2 MG/ML
INJECTION INTRAMUSCULAR; INTRAVENOUS AS NEEDED
Status: DISCONTINUED | OUTPATIENT
Start: 2021-11-24 | End: 2021-11-24 | Stop reason: HOSPADM

## 2021-11-24 RX ORDER — SODIUM CHLORIDE 0.9 % (FLUSH) 0.9 %
5-40 SYRINGE (ML) INJECTION EVERY 8 HOURS
Status: DISCONTINUED | OUTPATIENT
Start: 2021-11-24 | End: 2021-11-24 | Stop reason: HOSPADM

## 2021-11-24 RX ORDER — LIDOCAINE HYDROCHLORIDE 20 MG/ML
INJECTION, SOLUTION EPIDURAL; INFILTRATION; INTRACAUDAL; PERINEURAL AS NEEDED
Status: DISCONTINUED | OUTPATIENT
Start: 2021-11-24 | End: 2021-11-24 | Stop reason: HOSPADM

## 2021-11-24 RX ORDER — FENTANYL CITRATE 50 UG/ML
25 INJECTION, SOLUTION INTRAMUSCULAR; INTRAVENOUS AS NEEDED
Status: DISCONTINUED | OUTPATIENT
Start: 2021-11-24 | End: 2021-11-24 | Stop reason: HOSPADM

## 2021-11-24 RX ORDER — PANTOPRAZOLE SODIUM 40 MG/1
40 TABLET, DELAYED RELEASE ORAL ONCE
Status: COMPLETED | OUTPATIENT
Start: 2021-11-24 | End: 2021-11-24

## 2021-11-24 RX ORDER — KETAMINE HYDROCHLORIDE 10 MG/ML
INJECTION, SOLUTION INTRAMUSCULAR; INTRAVENOUS AS NEEDED
Status: DISCONTINUED | OUTPATIENT
Start: 2021-11-24 | End: 2021-11-24 | Stop reason: HOSPADM

## 2021-11-24 RX ORDER — MIDAZOLAM HYDROCHLORIDE 1 MG/ML
INJECTION, SOLUTION INTRAMUSCULAR; INTRAVENOUS
Status: SHIPPED | OUTPATIENT
Start: 2021-11-24 | End: 2021-11-24

## 2021-11-24 RX ORDER — DEXMEDETOMIDINE HYDROCHLORIDE 100 UG/ML
INJECTION, SOLUTION INTRAVENOUS
Status: SHIPPED | OUTPATIENT
Start: 2021-11-24 | End: 2021-11-24

## 2021-11-24 RX ORDER — ROPIVACAINE HYDROCHLORIDE 5 MG/ML
INJECTION, SOLUTION EPIDURAL; INFILTRATION; PERINEURAL
Status: SHIPPED | OUTPATIENT
Start: 2021-11-24 | End: 2021-11-24

## 2021-11-24 RX ORDER — SODIUM CHLORIDE, SODIUM LACTATE, POTASSIUM CHLORIDE, CALCIUM CHLORIDE 600; 310; 30; 20 MG/100ML; MG/100ML; MG/100ML; MG/100ML
1000 INJECTION, SOLUTION INTRAVENOUS CONTINUOUS
Status: DISCONTINUED | OUTPATIENT
Start: 2021-11-24 | End: 2021-11-24 | Stop reason: HOSPADM

## 2021-11-24 RX ORDER — TRANEXAMIC ACID 650 1/1
1950 TABLET ORAL ONCE
Status: DISCONTINUED | OUTPATIENT
Start: 2021-11-24 | End: 2021-11-24

## 2021-11-24 RX ORDER — DEXTROSE 50 % IN WATER (D50W) INTRAVENOUS SYRINGE
25-50 AS NEEDED
Status: DISCONTINUED | OUTPATIENT
Start: 2021-11-24 | End: 2021-11-24 | Stop reason: HOSPADM

## 2021-11-24 RX ORDER — HYDROMORPHONE HYDROCHLORIDE 2 MG/ML
INJECTION, SOLUTION INTRAMUSCULAR; INTRAVENOUS; SUBCUTANEOUS AS NEEDED
Status: DISCONTINUED | OUTPATIENT
Start: 2021-11-24 | End: 2021-11-24 | Stop reason: HOSPADM

## 2021-11-24 RX ORDER — GUAIFENESIN 100 MG/5ML
81 LIQUID (ML) ORAL 2 TIMES DAILY
Qty: 42 TABLET | Refills: 0 | Status: SHIPPED | OUTPATIENT
Start: 2021-11-24

## 2021-11-24 RX ADMIN — PROPOFOL 200 MG: 10 INJECTION, EMULSION INTRAVENOUS at 07:59

## 2021-11-24 RX ADMIN — PANTOPRAZOLE SODIUM 40 MG: 40 TABLET, DELAYED RELEASE ORAL at 06:28

## 2021-11-24 RX ADMIN — KETAMINE HYDROCHLORIDE 10 MG: 10 INJECTION, SOLUTION INTRAMUSCULAR; INTRAVENOUS at 08:37

## 2021-11-24 RX ADMIN — TRANEXAMIC ACID 1 G: 10 INJECTION, SOLUTION INTRAVENOUS at 08:09

## 2021-11-24 RX ADMIN — ONDANSETRON HYDROCHLORIDE 4 MG: 2 INJECTION INTRAMUSCULAR; INTRAVENOUS at 08:59

## 2021-11-24 RX ADMIN — LIDOCAINE HYDROCHLORIDE 60 MG: 20 INJECTION, SOLUTION INTRAVENOUS at 07:59

## 2021-11-24 RX ADMIN — CEFAZOLIN 2 G: 1 INJECTION, POWDER, FOR SOLUTION INTRAVENOUS at 08:07

## 2021-11-24 RX ADMIN — TRANEXAMIC ACID 1 G: 10 INJECTION, SOLUTION INTRAVENOUS at 09:01

## 2021-11-24 RX ADMIN — HYDROMORPHONE HYDROCHLORIDE 0.25 MG: 2 INJECTION, SOLUTION INTRAMUSCULAR; INTRAVENOUS; SUBCUTANEOUS at 08:37

## 2021-11-24 RX ADMIN — DEXMEDETOMIDINE HYDROCHLORIDE 20 MCG: 100 INJECTION, SOLUTION INTRAVENOUS at 07:18

## 2021-11-24 RX ADMIN — SODIUM CHLORIDE, SODIUM LACTATE, POTASSIUM CHLORIDE, AND CALCIUM CHLORIDE 125 ML/HR: 600; 310; 30; 20 INJECTION, SOLUTION INTRAVENOUS at 06:16

## 2021-11-24 RX ADMIN — HYDROMORPHONE HYDROCHLORIDE 0.5 MG: 1 INJECTION, SOLUTION INTRAMUSCULAR; INTRAVENOUS; SUBCUTANEOUS at 09:50

## 2021-11-24 RX ADMIN — HYDROMORPHONE HYDROCHLORIDE 0.25 MG: 2 INJECTION, SOLUTION INTRAMUSCULAR; INTRAVENOUS; SUBCUTANEOUS at 09:09

## 2021-11-24 RX ADMIN — FENTANYL CITRATE 25 MCG: 50 INJECTION, SOLUTION INTRAMUSCULAR; INTRAVENOUS at 10:05

## 2021-11-24 RX ADMIN — KETAMINE HYDROCHLORIDE 10 MG: 10 INJECTION, SOLUTION INTRAMUSCULAR; INTRAVENOUS at 08:19

## 2021-11-24 RX ADMIN — MIDAZOLAM 2 MG: 1 INJECTION INTRAMUSCULAR; INTRAVENOUS at 07:18

## 2021-11-24 RX ADMIN — HYDROMORPHONE HYDROCHLORIDE 0.25 MG: 2 INJECTION, SOLUTION INTRAMUSCULAR; INTRAVENOUS; SUBCUTANEOUS at 08:11

## 2021-11-24 RX ADMIN — SODIUM CHLORIDE, SODIUM LACTATE, POTASSIUM CHLORIDE, AND CALCIUM CHLORIDE: 600; 310; 30; 20 INJECTION, SOLUTION INTRAVENOUS at 09:00

## 2021-11-24 RX ADMIN — HYDROMORPHONE HYDROCHLORIDE 0.25 MG: 2 INJECTION, SOLUTION INTRAMUSCULAR; INTRAVENOUS; SUBCUTANEOUS at 08:22

## 2021-11-24 RX ADMIN — HYDROMORPHONE HYDROCHLORIDE 0.5 MG: 1 INJECTION, SOLUTION INTRAMUSCULAR; INTRAVENOUS; SUBCUTANEOUS at 09:40

## 2021-11-24 RX ADMIN — ACETAMINOPHEN 1000 MG: 500 TABLET ORAL at 06:28

## 2021-11-24 RX ADMIN — DEXAMETHASONE SODIUM PHOSPHATE 8 MG: 4 INJECTION, SOLUTION INTRAMUSCULAR; INTRAVENOUS at 06:29

## 2021-11-24 RX ADMIN — FENTANYL CITRATE 25 MCG: 50 INJECTION, SOLUTION INTRAMUSCULAR; INTRAVENOUS at 09:56

## 2021-11-24 RX ADMIN — KETAMINE HYDROCHLORIDE 10 MG: 10 INJECTION, SOLUTION INTRAMUSCULAR; INTRAVENOUS at 08:52

## 2021-11-24 RX ADMIN — KETAMINE HYDROCHLORIDE 20 MG: 10 INJECTION, SOLUTION INTRAMUSCULAR; INTRAVENOUS at 07:59

## 2021-11-24 RX ADMIN — SODIUM CHLORIDE, POTASSIUM CHLORIDE, SODIUM LACTATE AND CALCIUM CHLORIDE 1000 ML: 600; 310; 30; 20 INJECTION, SOLUTION INTRAVENOUS at 06:17

## 2021-11-24 RX ADMIN — ROPIVACAINE HYDROCHLORIDE 25 ML: 5 INJECTION, SOLUTION EPIDURAL; INFILTRATION; PERINEURAL at 07:18

## 2021-11-24 NOTE — PERIOP NOTES
Pt. Used restroom in pre-op area with assistance. Patient placed on Linda Paws for a minimum of 30 min in  Preop.

## 2021-11-24 NOTE — OP NOTES
Right Tourniquetless Cruciate Retaining Cemented Total Knee Arthroplasty    Patient: Michael Riggins MRN: [id number]  CSN: [id number]   YOB: 1965  Age: 55 y.o. Sex: male      Date of Surgery:11/24/2021    PREOPERATIVE DIAGNOSES : RIGHT KNEE OSTEOARTHRITIS    POSTOPERATIVE DIAGNOSES : RIGHT KNEE OSTEOARTHRITIS    PROCEDURE: Right tourniquetless cruciate retaining cemented total knee arthroplasty using the Attune knee system by [employer]. IMPLANTS:   Implant Name Type Inv. Item Serial No.  Lot No. LRB No. Used Action   CEMENT BNE 40GM FULL DOSE PMMA W/O ANTIBIO HI VISC N RADPQ - [id number]  CEMENT BNE 40GM FULL DOSE PMMA W/O ANTIBIO HI VISC N RADPQ  [facility] [employer] [facility] [id number] Right 1 Implanted   COMPONENT PAT [id number] KNEE POLY ISIDORO MEDIALIZED CEDRIC ATTUNE - [id number]  COMPONENT PAT [id number] KNEE POLY ISIDORO MEDIALIZED CEDRIC ATTUNE  [facility] [employer] [facility] [id number] Right 1 Implanted   BASEPLATE TIB SZ 8 ISIDORO ROT PLATFRM KNEE SYS S + ATTUNE - [id number]  BASEPLATE TIB SZ 8 ISIDROO ROT PLATFRM KNEE SYS S + ATTUNE  [facility] [employer] [facility] [id number] Right 1 Implanted   INSERT TIB SZ 8 THK5MM CRUCE RET ROT PLATFRM ATTUNE - [id number]  INSERT TIB SZ 8 THK5MM CRUCE RET ROT PLATFRM ATTUNE  [facility] [employer] [facility] [id number] Right 1 Implanted   COMPONENT FEM SZ 8 R KNEE CRUCE RET ISIDORO ATTUNE - [id number]  COMPONENT FEM SZ 8 R KNEE CRUCE RET ISIDORO ATTUNE  [facility] [employer] [facility] [id number] Right 1 Implanted       SURGEON: Ismael Morales MD    FIRST ASSISTANT: Levar Olsen PA-C    SECOND ASSISTANT: Physician Assistant: Kevan Ray PA-C    ANESTHESIA: General    TOURNIQUET TIME:  None    SPECIMEN TO PATHOLOGY:  * No specimens in log *    ESTIMATED BLOOD LOSS: 75cc    FINDINGS:  Same    COMPLICATIONS:  None     INDICATIONS:  A 55 y.o. WHITE/NON- male  with known right severe gonarthrosis.   The patient has bone-on-bone arthritis by x-rays and has failed all conservative interventions including medications, weight loss, physical therapy, relative rest, ambulatory aids and injections. The patient now presents for a right total knee arthroplasty due to constant pain with activities of daily living and diminished safety due to patients pain. OPERATION:  The patient was brought to the operating theater   and, after adequate anesthesia, the right knee was prepped and draped in the   typical sterile fashion. The 1.95gm of po tranexamic acid was already administered 2 hours before surgery. The analgesic cocktail used for the case was 50cc of .25% Marcaine with epinephrine mixed with 30 mg( 1cc ) of Toradol and 30cc of NS ( total of 81 cc). 40cc's was injected in the skin and capsule/quadriceps after the incision. The Argon Wand was used during the case for bleeding control. An anterior midline   incision was then made from just above the patella to the tibial tubercle. The medial and lateral flaps were developed and then a sub vastus approach   to the knee was then performed. The dissection was carried on the proximal   medial tibia from anterior to posterior, taking the anterior half of the   medial meniscus. The lateral joint line was exposed up to the anterolateral   corner, and then the patella was slid lateral and the knee flexed to   greater than 90 degrees with Z retractors being placed. Findings at this   time showed significant arthritic change. The ACL was resected. The PCL was preserved. The external alignment guide for the [employer] system was then lined up with the first webspace. The guide was made   parallel to the anterior tibia and then the cutting guide was placed at a 5   degree slope. It was placed so that approximately a couple of millimeters were   taken off the lowest side. It was then pinned and the proximal tibia was   then resected.  Tibial resection cut alignment was performed with the drop down anitha and found to be aligned with the first web space. The femur was then addressed next. The large drill bit was placed down the femoral canal and the distal femoral cutting guide was then pinned to the   anterior femur at 9 mm below the lowest surface, and was placed at a 5   degree valgus angle. The distal femur was then resected, and extension gap   was measured and the correct mm polyethylene thickness was selected. The knee was   then flexed to 90 degrees. The knee was kept at 90 degrees and  / sizer was inserted over the short IM anitha and the anterior lateral femur was referenced. The size was then measured and noted. The guide was then distracted so flexion gap equalled extension gap ( and was stable ) and the guide was pinned. The femoral finishing guide was placed over the 2 pins and then 2 lock down pins were placed medially and laterally. The flexion gap   as checked again and found to be stable. The anterior and posterior cuts,   along with both chamfer cuts were then performed. The guide was removed, as   well as any free bony fragments. The posterior horns of both the medial and   lateral menisci were resected. The femoral resection guide was used to cut out the small amount of  bone for the CR femur. The attention was now turned to the tibia. The pickle fork was placed in the posterior part of the tibia and, using the lateral & medial knee retractors, the   tibia was brought into the wound. Any further bony work, as well as any   meniscal work was done at this time to remove these fragments. The tibia   was then sized with the tibial baseplate. This guide was aligned to the medial third of the tibial tubercle. It was pinned in position. The smokestack guide that was on the Supramed system was placed through the top of this baseplate, locking the plate in good position. The guide was then reamed down to the appropriate depth, and then the keel   punch was placed.  The guide system was removed and the trial tibial polyethylene was snapped into position, and then the appropriate size femur was   placed on the distal femur. There was good fit to both components. The natural patella tracked very well. These components were selected for implantation. The patella was everted. It was measured and it was cut from the original thickness of 30 to the residual thickness of about 20 mm. It was cut by free hand technique and it was cut from the medial articular edge to the lateral articular edge. The patella was   then laterally electrocauterized and then the patellar clamp was   placed on the cut surface of the patella. It was placed perpendicular to   the trochlear groove and then it was flipped into position and the anatomic patella tracked well. The 3 peg holes were drilled and  the excessive bone on the lateral side was resected. The lateral retinaculum was released subperiosteally off the patella. The wound was then irrigated out copiously. The posterior medial and posterior lateral knee was injected with 20cc's each of the remaining analgesic cocktail. All trial components were removed and the real components were opened, and the [employer] HV cement was mixed on the back table. The knee was then Simpulse lavaged and dried. The cement was then packed on the inferior surface of the tibial baseplate with cement down the cone. The tibia was then impacted. All excessive cement was removed with pickups and a knife. The tibial   polyethylene was then placed into the baseplate, and   then the femur was cemented next with cement being put on the posterior   part of each femoral runner, and then an additional amount of cement placed   in a U-shaped pattern around medial femoral condyle, anterior femur, and   the lateral femoral condyle. This was hand packed into the distal femur. The femoral component was then placed, impacted into position. Any   excessive cement was removed with pickups and a knife.  The patella was   addressed next, and the anatomic patella was cemented by placing cement on the   posterior part of the patellar component. It was placed into the 3 drill   holes and held into position with a clamp until it hardened. The patient now had full extension to flexion of 130 degrees. Once the cement was hardened, the clamp was removed. The patella   tracked with a no-thumbs technique very nicely. The wound was irrigated out. The skin was closed with a running #2 Quill stitch. The shin was then closed with inverted 2-0 Vicryls. The skin was sealed with the Dermabond   Prineo skin system, dressed with a Mepilex dressing then 4x4, ABD's and Large Ace wrap from toes to thigh. The patient returned to the recovery room awake, in   stable condition. All instrument, sponge and needle counts were correct. The knee was dressed with a Mepilex and an Ace wrap and a knee immobilzer. During multiple steps in the procedure the simpulse lavage was used with a 500cc bag of normal saline with 30cc of 5% sterile opthalmologic povidone solution ( .30% ). Before component implantation the bone was irrigated  with normal saline on a bulb syringe. At the end of the case another 500cc normal saline bag (with 50,000 units of bacitracin and 500,000 units of polymixin )was attached to the simpulse lavage and the entire wound reirrgated before closure. A physician assistant was used during the surgery which contributes to better patient outcomes by decreasing operating room time and decreasing the amount of anesthesia the patient had to undergo. The physician assistant helped with positioning and draping of the patient for implantation of implants, retraction of soft tissues so as not to traumatize the tissues. During several parts of the procedure the PA used the Simpulse lavage to irrigate/suction the sterile field which contributed to a  surgical field and decrease in infection rates.  The physician assistant used the cauterization under my guidance to decrease blood loss which limits the need for blood transfusion in the post operative period. The physician assistant instilled local anesthetic which decreased the need for post operative narcotics. During closure the physician assistant was able to close the fascia layer independently using a running Stratafix closure system ensuring a water tight fascia closure and decreasing the risk of deep suzette-prosthetic infection. The superficial tissues were closed using inverted 2-0 Vicryl sutures by the physician assistant as well. The skin was closed using an Exofin skin closure system so that there was no discharge from the incision. This helps contribute to a lower risk of infection. During the procedure the physician assistant was given the task of irrigating the wound allowing myself to prepare the prosthesis for implantation.                Omar Mijares MD  11/24/2021

## 2021-11-24 NOTE — PROGRESS NOTES
Problem: Mobility Impaired (Adult and Pediatric)  Goal: *Acute Goals and Plan of Care (Insert Text)  Description: PT goals to be met in 1 day:  Pt will be able to perform supine<>sit SBA for transfers at home. Pt will be able to perform sit<>stand SBA for increased ability to transfer at home safely. Pt will be able to participate in gt training >100' w/ RW, WBAT, GB and CGA/SBA for improved ability in home upon d/c. Pt will be able to perform stair training step to pattern, B/U rail and CGA to obtain safe entry into home upon d/c. Pt will be educated regarding HEP per MD protocol for optimal AROM/strength outcomes. Note: [x]  Patient has met MD mobilization critieria for d/c home   [x]  Recommend HH with 24 hour adult care   []  Benefit from additional acute PT session to address:      PHYSICAL THERAPY EVALUATION    Patient: Sury Pastor (26 y.o. male)  Date: 11/24/2021  Primary Diagnosis: Right knee DJD [M17.11]  Procedure(s) (LRB):  RIGHT TOTAL KNEE ARTHROPLASTY (Right) Day of Surgery   Precautions:   Fall, WBAT    PLOF: Independent    ASSESSMENT :  Based on the objective data described below, the patient presents with decreased mobility in regards to bed mobility, transfers, gt quality and tolerance, balance, stair negotiation and safety due to R TKA surgery. Decreased AROM of R knee, dec strength of R knee, pain in R knee, dec sensation of R knee, c/o nausea also impacting pt functional mobility. Pt rating pain on numerical pain scale pre/post and during session 6/10. Pt and wife ed regarding mobility safety, WB, HEP, ice application/use, elevation, environmental safety and home safe techniques. Pt able to perform sit<>stand w/ CGA/SBA. Safety vc required throughout session to reinforce safety. Pt able to participate in gt training using RW, GB, WBAT and CGA w/ antalgic gt pattern. Pt was able to participate in stair training using step to pattern, B rails and CGA.   Answered questions by pt and wife in regards to PT and mobility. Pt left sitting in recliner w/ all needs within reach. Nurse Dominik Chand aware of session and outcomes. Recommend HHPT with responsible adult care at least 24 hours upon hospital d/c. Patient will benefit from skilled intervention to address the above impairments. Patient's rehabilitation potential is considered to be Good  Factors which may influence rehabilitation potential include:   []         None noted  []         Mental ability/status  []         Medical condition  []         Home/family situation and support systems  []         Safety awareness  [x]         Pain tolerance/management  []         Other:      PLAN :  Recommendations and Planned Interventions:   [x]           Bed Mobility Training             []    Neuromuscular Re-Education  [x]           Transfer Training                   []    Orthotic/Prosthetic Training  [x]           Gait Training                          [x]    Modalities  [x]           Therapeutic Exercises           [x]    Edema Management/Control  [x]           Therapeutic Activities            [x]    Family Training/Education  [x]           Patient Education  []           Other (comment):    Frequency/Duration: Patient will be followed by physical therapy 1-2 times per day/4-7 days per week to address goals. Discharge Recommendations: Home Health  Further Equipment Recommendations for Discharge: N/A     SUBJECTIVE:   Patient stated I just feel nauseated and uncomfortable.     OBJECTIVE DATA SUMMARY:     Past Medical History:   Diagnosis Date    Allergic rhinitis     Arthritis     Concussion     high school loc unknown    Enlarged prostate     GERD (gastroesophageal reflux disease)     Sleep apnea      cpap mild    Vertigo     crystals used     Past Surgical History:   Procedure Laterality Date    COLONOSCOPY N/A 12/15/2020    COLONOSCOPY; POLYPECTOMY performed by Zuly Hudson MD at [address] ARTHROSCOPY Right 1993    HX OTHER SURGICAL      moles removed     Barriers to Learning/Limitations: yes;  anesthesia  Compensate with: Visual Cues, Verbal Cues, and Tactile Cues  Home Situation:  Home Situation  Home Environment: Private residence  # Steps to Enter: 1  One/Two Story Residence: Two story  # of Interior Steps: 13  Interior Rails: Left  Living Alone: No  Support Systems: Spouse/Significant Other  Patient Expects to be Discharged to[de-identified] [employer]  Current DME Used/Available at Home: CPAP  Tub or Shower Type: Shower  Critical Behavior:  Neurologic State: Alert; Drowsy  Orientation Level: Oriented to person; Oriented to place; Oriented to situation  Cognition: Follows commands  Safety/Judgement: Awareness of environment  Psychosocial  Patient Behaviors: Calm; Cooperative  Strength:    Strength: Generally decreased, functional  Tone & Sensation:   Tone: Normal  Sensation: Impaired (R knee)  Range Of Motion:  AROM: Generally decreased, functional  Posture:  Functional Mobility:  Bed Mobility:  Scooting: Supervision  Transfers:  Sit to Stand: Stand-by assistance; Contact guard assistance (vc)  Stand to Sit: Contact guard assistance; Stand-by assistance (vc)  Balance:   Sitting: Intact  Standing: Intact; With support  Ambulation/Gait Training:  Distance (ft): 80 Feet (ft)  Assistive Device: Walker, rolling; Gait belt  Ambulation - Level of Assistance: Contact guard assistance (vc)  Gait Abnormalities: Antalgic; Decreased step clearance; Step to gait  Right Side Weight Bearing: As tolerated  Base of Support: Shift to left  Stance: Right decreased  Speed/Kalina: Slow  Step Length: Left shortened; Right shortened  Swing Pattern: Left asymmetrical; Right asymmetrical  Interventions: Safety awareness training; Tactile cues; Verbal cues;  Visual/Demos  Stairs:  Number of Stairs Trained: 1  Stairs - Level of Assistance: Contact guard assistance (vc)  Rail Use: Both  Therapeutic Exercises:   Encouraged HEP  Pain:  Pain level pre-treatment: 3/10 Pain level post-treatment: 3/10   Pain Intervention(s) : Medication (see MAR); Rest, Ice, Repositioning  Response to intervention: Nurse notified, See doc flow    Activity Tolerance:   Fair  Please refer to the flowsheet for vital signs taken during this treatment. After treatment:   [x]         Patient left in no apparent distress sitting up in chair  []         Patient left in no apparent distress in bed  [x]         Call bell left within reach  [x]         Nursing notified  [x]         Caregiver present  []         Bed alarm activated  []         SCDs applied    COMMUNICATION/EDUCATION:   [x]         Role of Physical Therapy in the acute care setting. [x]         Fall prevention education was provided and the patient/caregiver indicated understanding. [x]         Patient/family have participated as able in goal setting and plan of care. [x]         Patient/family agree to work toward stated goals and plan of care. []         Patient understands intent and goals of therapy, but is neutral about his/her participation. []         Patient is unable to participate in goal setting/plan of care: ongoing with therapy staff.  []         Other:     Thank you for this referral.  Zheng Duque, PT   Time Calculation: 24 mins      Eval Complexity: History: HIGH Complexity :3+ comorbidities / personal factors will impact the outcome/ POC Exam:MEDIUM Complexity : 3 Standardized tests and measures addressing body structure, function, activity limitation and / or participation in recreation  Presentation: LOW Complexity : Stable, uncomplicated  Clinical Decision Making:Low Complexity    Overall Complexity:LOW

## 2021-11-24 NOTE — DISCHARGE INSTRUCTIONS
Cameron Dailey III, MD Zackary Ricks, PA-C    Lower Extremity Surgery  Discharge Instructions      Please take the time to review the following instructions before you leave the hospital and use them as guidelines during your recovery from surgery. If you have any questions you may contact my office at [phone]. Wound Care/Dressing Changes:    [x]   You may change your dressing as needed. Beginning the 2 days after you are discharged from the hospital you can change your dressing daily. A big, bulky dressing isn't necessary as long as there isn't any drainage from the incisions. You will be shown how to change the dressings properly by the home health aids as well. There will be a piece of tape over your incision that resembles gauze. This tape should stay on and you should not attempt to remove it. Once you begin to get this wet in the shower this will begin to fall off on its own, although it will take days. Do not apply antibiotic ointment to your incisions. Showering/Bathing:    [x]   You may shower 2 days after surgery. Your dressing may be removed for showering. You may get your incisions wet in the shower. Don't vigorously scrub the area where your incisions are. Please pat dry the incision. Apply a clean, dry dressing after drying off the area of your incisions. Don't take a tub bath, get in a swimming pool or Jacuzzi until the incisions are completely healed, and you are seen in the office by Dr. Taj Villa. Do not soak your incisions under water. []   Do not get the dressing wet. Once you remove it two days from surgery, you may get the incision wet if there is no drainage. Weight Bearing Status/Braces/Activity:    [x]   If you have had a total knee replacement you may weight bear as tolerated. Use crutches, cane, or walker as needed. You need to begin working on range of motion early after your surgery.   It is very important to work on extension ([address]) the knee.  You will be advanced with walking and range of motion by physical therapy at home.     []   If you have had a total hip replacement you may weight bear as tolerated. Physical therapy with come by your house to help you perform exercises and work on strength and range of motion. Ice/Elevation:    Continue ice and elevation consistently for 48 hours after surgery. If you have had a total knee replacement when elevating your knee elevate it on about 4 pillows to be sure it is above your heart. After 48 hours, you should ice your knee 3 times per day, for 20 minutes at a time for the next 5 days. After one week from surgery, you may use ice and elevation as needed for pain and swelling. Diet:    You may advance to your regular diet as tolerated. Medication:    1. You will be given a prescription for pain medications when you are discharged from the hospital.  Take the medication as needed according to the directions on the prescription bottle. Possible side effects of the medication include dizziness, headache, nausea, vomiting, constipation and urinary retention. If you experience any of these side effects call the office so that we can assist you in relieving them. Discontinue the use of the pain medication if you develop itching, rash, shortness of breath or difficulties swallowing. If these symptoms become severe or aren't relieved by discontinuing the medication you should seek immediate medical attention. Refills of pain medication are authorized during office hours only (8AM - 5PM Mon thru Fri). 2. If you were prescribed Percocet/oxycodone or Dilaudid/hydromorphone you must have a written prescription. These medications legally cannot be called in to a pharmacy. 3. You should take 1000 mg of tylenol miguelina 8 hours. Do not exceed 3000 mg of Tylenol per day. If you have been given Norco for post operative pain, do not take the tylenol.   4. You should use Aspirin 81 mg twice daily for 21 days from the date of your surgery. This will help to prevent blood clots from forming in your legs. This needs to be started the day after your surgery and home healthcare will teach you how this is done. If you are taking another medication such as Xarelto as discussed with Dr. Jorge Ramirez this should also be started the day after the surgery. 5. You may resume the medications you were taking prior to your surgery, unless otherwise specified in your discharge instructions. Pain medication may change the effects of any antidepressant medication. If you have any questions about possible interactions between your regular medications and the pain medication you should consult the physician who prescribes your regular medications. 6. If you had a total joint as an outpatient procedure and did not spend the night in the hospital, Dr. Jorge Ramirez has written for an oral antibiotic that you should take as instructed. This antibiotic is generally Keflex or Clindamycin. If you spent the night in the hospital the antibiotic was given to you through the IV and there is nothing else to take orally. Follow Up Appointment:    If you are unsure of your follow-up appointment date and time, please call [phone]. Please let our  know you are scheduling a post-op appointment. Most appointments should be between 7-14 days after your surgery. Physical Therapy:    [x]   Physical therapy will be discussed with you at your first follow-up appointment with Dr. Jorge Ramirez. You are to participate with [address] [place] [address] as arranged pre-operatively in the convience of your own home. Important signs and symptoms:    If any of the following signs and symptoms occurs, you should contact Dr. Jorge Ramirez' office.   Please be advised if a problem arises which you feel required immediate medical attention or your are unable to contact Dr. Jorge Ramirez' office you should seek immediate medical attention. Signs and symptoms to watch for include:  1. A sudden increase in swelling and/or redness or warmth at the area your surgery was performed which isn't relieved by rest, ice and elevation. 2. Oral temperature greater than 101.5 degrees for 12 hours or more which isn't relieved by an increase in fluid intake and taking two Tylenol every 4-6 hours. Do not exceed 3000 mg of Tylenol per day. 3. Excessive drainage from your incisions or drainage that hasn't stopped by 72 hours. 4. Calf pian, tenderness, redness or swelling which isn't relieved with rest and elevation. 5. Fever, chills, shortness of breath, chest pain, nausea, vomiting or other signs and symptoms which are of concern to you. DISCHARGE SUMMARY from Nurse    PATIENT INSTRUCTIONS:    After general anesthesia or intravenous sedation, for 24 hours or while taking prescription Narcotics:  · Limit your activities  · Do not drive and operate hazardous machinery  · Do not make important personal or business decisions  · Do  not drink alcoholic beverages  · If you have not urinated within 8 hours after discharge, please contact your surgeon on call. Report the following to your surgeon:  · Excessive pain, swelling, redness or odor of or around the surgical area  · Temperature over 100.5  · Nausea and vomiting lasting longer than 4 hours or if unable to take medications  · Any signs of decreased circulation or nerve impairment to extremity: change in color, persistent  numbness, tingling, coldness or increase pain  · Any questions    What to do at Home:  Recommended activity: Activity as tolerated,   REGULAR DIET  RETURN TO OFFICE IN 2 WEEKS CALL FOR APPT  If you experience any of the following symptoms, as per above please follow up with your surgeon. *  Please give a list of your current medications to your Primary Care Provider.     *  Please update this list whenever your medications are discontinued, doses are      changed, or new medications (including over-the-counter products) are added. *  Please carry medication information at all times in case of emergency situations. These are general instructions for a healthy lifestyle:    No smoking/ No tobacco products/ Avoid exposure to second hand smoke  Surgeon General's Warning:  Quitting smoking now greatly reduces serious risk to your health. Obesity, smoking, and sedentary lifestyle greatly increases your risk for illness    A healthy diet, regular physical exercise & weight monitoring are important for maintaining a healthy lifestyle    You may be retaining fluid if you have a history of heart failure or if you experience any of the following symptoms:  Weight gain of 3 pounds or more overnight or 5 pounds in a week, increased swelling in our hands or feet or shortness of breath while lying flat in bed. Please call your doctor as soon as you notice any of these symptoms; do not wait until your next office visit. Patient armband removed and shredded    The discharge information has been reviewed with the patient and caregiver. The patient and caregiver verbalized understanding. Discharge medications reviewed with the patient and caregiver and appropriate educational materials and side effects teaching were provided.   ___________________________________________________________________________________________________________________________________

## 2021-11-24 NOTE — PERIOP NOTES
TRANSFER - IN REPORT:    Verbal report received from HERNANDEZ & Dr. Elfego Bryson on Onita Shaneka  being received from OR (unit) for routine post - op      Report consisted of patients Situation, Background, Assessment and   Recommendations(SBAR). Information from the following report(s) SBAR was reviewed with the receiving nurse. Opportunity for questions and clarification was provided. Assessment completed upon patients arrival to unit and care assumed.

## 2021-11-24 NOTE — ANESTHESIA PROCEDURE NOTES
Peripheral Block    Start time: 11/24/2021 7:18 AM  End time: 11/24/2021 7:21 AM  Performed by: Radha Morelos MD  Authorized by: Radha Morelos MD       Pre-procedure: Indications: at surgeon's request and post-op pain management    Preanesthetic Checklist: patient identified, risks and benefits discussed, site marked, timeout performed, anesthesia consent given and patient being monitored    Timeout Time: 07:18 EST          Block Type:   Block Type:   Adductor canal block  Laterality:  Right  Monitoring:  Standard ASA monitoring, continuous pulse ox, frequent vital sign checks, heart rate, responsive to questions and oxygen  Injection Technique:  Single shot  Procedures: ultrasound guided    Patient Position: supine  Prep: chlorhexidine    Needle Type:  Stimuplex  Needle Gauge:  21 G  Needle Localization:  Anatomical landmarks and ultrasound guidance  Medication Injected:  Midazolam (VERSED) injection, 2 mg  ropivacaine (PF) (NAROPIN) 5 mg/mL (0.5 %) injection, 25 mL  dexmedeTOMidine (PRECEDEX) 100 mcg/mL iv solution, 20 mcg  Med Admin Time: 11/24/2021 7:18 AM    Assessment:  Number of attempts:  1  Injection Assessment:  Incremental injection every 5 mL, local visualized surrounding nerve on ultrasound, negative aspiration for blood, no paresthesia, no intravascular symptoms and ultrasound image on chart  Patient tolerance:  Patient tolerated the procedure well with no immediate complications

## 2021-11-24 NOTE — ANESTHESIA PREPROCEDURE EVALUATION
Relevant Problems   RESPIRATORY SYSTEM   (+) Sleep apnea       Anesthetic History   No history of anesthetic complications            Review of Systems / Medical History  Patient summary reviewed, nursing notes reviewed and pertinent labs reviewed    Pulmonary        Sleep apnea           Neuro/Psych   Within defined limits           Cardiovascular                  Exercise tolerance: >4 METS     GI/Hepatic/Renal     GERD           Endo/Other        Arthritis     Other Findings              Physical Exam    Airway  Mallampati: II  TM Distance: 4 - 6 cm  Neck ROM: normal range of motion   Mouth opening: Normal     Cardiovascular    Rhythm: regular  Rate: normal         Dental  No notable dental hx       Pulmonary  Breath sounds clear to auscultation               Abdominal  GI exam deferred       Other Findings            Anesthetic Plan    ASA: 2  Anesthesia type: general and regional - femoral single shot      Post-op pain plan if not by surgeon: peripheral nerve block single    Induction: Intravenous  Anesthetic plan and risks discussed with: Patient      Risk of a block include nerve injury, bleeding, infection, and failure as the most common ones although they rare.   Ga/lma/right adductor block

## 2021-11-24 NOTE — PROGRESS NOTES
Problem: Self Care Deficits Care Plan (Adult)  Goal: *Acute Goals and Plan of Care (Insert Text)  Description: Initial Occupational Therapy Goals (11/24/2021) Within 7 day(s):    1. Patient will perform grooming standing sinkside with supervision for increased independence with ADLs. 2. Patient will perform LB dressing with supervision & A/E PRN for increased independence with ADLs. 3. Patient will perform toilet transfer with supervision for increased independence with ADLs. 4. Patient will perform all aspects of toileting with supervision for increased independence with ADLs. 5. Patient will independently apply energy conservation techniques with 1 verbal cue(s)for increased independence with ADLs. 6. Patient will perform bathroom mobility with supervision for increased independence/safety with ADLs. Outcome: Progressing Towards Goal   OCCUPATIONAL THERAPY EVALUATION    Patient: Edith Fish (11 y.o. male)  Date: 11/24/2021  Primary Diagnosis: Right knee DJD [M17.11]  Procedure(s) (LRB):  RIGHT TOTAL KNEE ARTHROPLASTY (Right) Day of Surgery   Precautions: Fall, WBAT  PLOF: pt independent for ADLs/functional mobility    ASSESSMENT AND RECOMMENDATIONS:  Based on the objective data described below, the patient presents with RLE decreased ROM and strength affecting LE ADLs. Pt found seated in recliner chair, vitals assessed and WNL, pt reporting pain 6/10, agreeable to therapy. Educated pt on proper body mechanics for ADLs s/p TKR. Pt completed upper body dressing with supervision seated in chair. Pt able to thread B feet through underwear/pants without assist, and CGA when standing to pull up to waist. Pt CGA/SBA for STS/bathroom mobility with vc for safe use of RW. Pt ambulated back to recliner, ice applied to R knee. Spouse present during session for education on home safety. Provided opportunity for pt to voice questions on ADL performance when home, pt has no further concerns.  Patient will benefit from skilled Occupational Therapy intervention to maximize safety/independence with ADLs at d/c.    Education: Reviewed home safety, body mechanics, importance of moving every hour to prevent joint stiffness, role of ice for edema/pain control, Rolling Walker management/safety, and adaptive dressing techniques with patient verbalizing  understanding at this time     Patient will benefit from skilled intervention to address the above impairments. Patient's rehabilitation potential is considered to be Good  Factors which may influence rehabilitation potential include:   []             None noted  []             Mental ability/status  []             Medical condition  []             Home/family situation and support systems  []             Safety awareness  [x]             Pain tolerance/management  []             Other:        PLAN :  Recommendations and Planned Interventions:   [x]               Self Care Training                  [x]      Therapeutic Activities  [x]               Functional Mobility Training   []      Cognitive Retraining  []               Therapeutic Exercises           []      Endurance Activities  []               Balance Training                    []      Neuromuscular Re-Education  []               Visual/Perceptual Training     [x]      Home Safety Training  [x]               Patient Education                   [x]      Family Training/Education  []               Other (comment):    Frequency/Duration: Patient will be followed by Occupational Therapy 1-2 times per day/4-7 days per week to address goals. Discharge Recommendations: Home health with adult supervision at least 24 hours after d/c  Further Equipment Recommendations for Discharge: N/A     SUBJECTIVE:   Patient stated i'm doing alright.     OBJECTIVE DATA SUMMARY:     Past Medical History:   Diagnosis Date    Allergic rhinitis     Arthritis     Concussion     high school loc unknown    Enlarged prostate     GERD (gastroesophageal reflux disease)     Sleep apnea      cpap mild    Vertigo     crystals used     Past Surgical History:   Procedure Laterality Date    COLONOSCOPY N/A 12/15/2020    COLONOSCOPY; POLYPECTOMY performed by Tiffany Joseph MD at [facility] ENDOSCOPY    HX KNEE ARTHROSCOPY Right 1993    HX OTHER SURGICAL      moles removed     Barriers to Learning/Limitations: yes;  physical and other (post-anesthesia)  Compensate with: visual, verbal, tactile, kinesthetic cues/model    Home Situation/Prior Level of Function:   Home Situation  Home Environment: Private residence  # Steps to Enter: 1  One/Two Story Residence: Two story  # of Interior Steps: 13  Interior Rails: Left  Living Alone: No  Support Systems: Spouse/Significant Other  Patient Expects to be Discharged to Cor[de-identified]ration  Current DME Used/Available at Home: CPAP, Walker, rolling, Cane, straight, Crutches  Tub or Shower Type: Shower  []  Right hand dominant   []  Left hand dominant    Cognitive/Behavioral Status:  Neurologic State: Alert; Drowsy  Orientation Level: Oriented to person; Oriented to place; Oriented to situation  Cognition: Follows commands  Safety/Judgement: Awareness of environment    Skin: R knee incision w/ Mepilex   Edema: compression hose in place & applied ice     Coordination: BUE  Coordination: Within functional limits  Fine Motor Skills-Upper: Left Intact; Right Intact    Gross Motor Skills-Upper: Left Intact; Right Intact    Balance:  Sitting: Intact  Standing: Intact;  With support    Strength: BUE  Strength: Generally decreased, functional    Tone & Sensation:BUE  Tone: Normal  Sensation: Impaired (R knee)    Range of Motion: BUE  AROM: Generally decreased, functional    Functional Mobility and Transfers for ADLs:  Bed Mobility:  Scooting: Supervision  Transfers:  Sit to Stand: Contact guard assistance; Stand-by assistance (vc)    ADL Assessment:  Feeding: Independent  Oral Facial Hygiene/Grooming: Stand-by assistance  Bathing: Contact guard assistance  Upper Body Dressing: Supervision  Lower Body Dressing: Minimum assistance  Toileting: Stand by assistance    ADL Intervention:  Upper Body Dressing Assistance  Dressing Assistance: Supervision  Pullover Shirt: Supervision    Lower Body Dressing Assistance  Dressing Assistance: Contact guard assistance  Underpants: Contact guard assistance  Pants With Elastic Waist: Contact guard assistance  Socks: Minimum assistance  Shoes with Elastic Laces: Minimum assistance  Leg Crossed Method Used: No  Position Performed: Seated in chair  Cues: Verbal cues provided; Visual cues provided    Cognitive Retraining  Safety/Judgement: Awareness of environment    Pain:  Pain level pre-treatment: 6/10  Pain level post-treatment: 4/10  Pain Intervention(s): Medication administer by Nursing (see MAR); Rest, Ice, Repositioning   Response to intervention: Nurse notified, see doc flow     Activity Tolerance:   Fair. Patient able to stand ~5 minute(s). Patient able to complete ADLs with intermittent rest breaks. Patient limited by pain, strength, ROM. Patient unsteady. Please refer to the flowsheet for vital signs taken during this treatment. After treatment:   [x]  Patient left in no apparent distress sitting up in chair  []  Patient sitting on EOB  []  Patient left in no apparent distress in bed  [x]  Call bell left within reach  [x]  Nursing notified  [x]  Caregiver present  [x]  Ice applied  []  SCD's on while back in bed  [] Bed alarm activated    COMMUNICATION/EDUCATION:   Communication/Collaboration:  [x]       Role of Occupational Therapy in the acute care setting. [x]      Home safety education was provided and the patient/caregiver indicated understanding. [x]      Patient/family have participated as able in goal setting and plan of care. [x]      Patient/family agree to work toward stated goals and plan of care.   []      Patient understands intent and goals of therapy, but is neutral about his/her participation. []      Patient is unable to participate in plan of care at this time. Thank you for this referral.  Conrad Bravo, OTR/L  Time Calculation: 23 mins    Eval Complexity: History: MEDIUM Complexity : Expanded review of history including physical, cognitive and psychosocial  history ; Examination: LOW Complexity : 1-3 performance deficits relating to physical, cognitive , or psychosocial skils that result in activity limitations and / or participation restrictions ;    Decision Making:LOW Complexity : No comorbidities that affect functional and no verbal or physical assistance needed to complete eval tasks

## 2021-11-24 NOTE — PERIOP NOTES
Patient is awake and alert complains of pain to right knee severe will medicate using MDA guidelines.

## 2021-11-24 NOTE — INTERVAL H&P NOTE
Update History & Physical    The Patient's History and Physical of November 22,   The surgery was reviewed with the patient and I examined the patient. There was no change. The surgical site was confirmed by the patient and me. Plan:  The risk, benefits, expected outcome, and alternative to the recommended procedure have been discussed with the patient. Patient understands and wants to proceed with the procedure.     Electronically signed by Supriya Treviño MD on 11/24/2021 at 7:38 AM

## 2021-11-24 NOTE — ANESTHESIA POSTPROCEDURE EVALUATION
Procedure(s):  RIGHT TOTAL KNEE ARTHROPLASTY. general, regional    Anesthesia Post Evaluation        Comments: Post-Anesthesia Evaluation and Assessment    Cardiovascular Function/Vital Signs  /76   Pulse 67   Temp 36.5 °C (97.7 °F)   Resp 10   Ht 6' 3\" (1.905 m)   Wt 118.3 kg (260 lb 11.2 oz)   SpO2 98%   BMI 32.59 kg/m²     Patient is status post Procedure(s):  RIGHT TOTAL KNEE ARTHROPLASTY. Nausea/Vomiting: Controlled. Postoperative hydration reviewed and adequate. Pain:  Pain Scale 1: FLACC (11/24/21 1019)  Pain Intensity 1: 0 (11/24/21 1019)   Managed. Neurological Status:   Neuro (WDL): Within Defined Limits (11/24/21 1015)   At baseline. Mental Status and Level of Consciousness: Arousable. Pulmonary Status:   O2 Device: Nasal cannula (11/24/21 1014)   Adequate oxygenation and airway patent. Complications related to anesthesia: None    Post-anesthesia assessment completed. No concerns. Patient has met all discharge requirements. Signed By: Seamus Henderson MD    November 24, 2021                   INITIAL Post-op Vital signs:   Vitals Value Taken Time   /76 11/24/21 1015   Temp 36.5 °C (97.7 °F) 11/24/21 1014   Pulse 67 11/24/21 1021   Resp 10 11/24/21 1021   SpO2 98 % 11/24/21 1021   Vitals shown include unvalidated device data.

## 2022-03-19 PROBLEM — G47.30 SLEEP APNEA: Status: ACTIVE | Noted: 2021-11-22

## 2022-03-19 PROBLEM — M17.11 RIGHT KNEE DJD: Status: ACTIVE | Noted: 2021-11-24

## 2022-03-20 PROBLEM — M17.11 PRIMARY OSTEOARTHRITIS OF RIGHT KNEE: Status: ACTIVE | Noted: 2021-11-22

## 2024-02-24 ENCOUNTER — HOSPITAL ENCOUNTER (EMERGENCY)
Facility: HOSPITAL | Age: 59
Discharge: HOME | End: 2024-02-24
Attending: EMERGENCY MEDICINE
Payer: OTHER GOVERNMENT

## 2024-02-24 ENCOUNTER — APPOINTMENT (OUTPATIENT)
Facility: HOSPITAL | Age: 59
End: 2024-02-24
Payer: OTHER GOVERNMENT

## 2024-02-24 VITALS
HEART RATE: 71 BPM | SYSTOLIC BLOOD PRESSURE: 152 MMHG | OXYGEN SATURATION: 98 % | TEMPERATURE: 97.5 F | WEIGHT: 260 LBS | RESPIRATION RATE: 18 BRPM | BODY MASS INDEX: 32.33 KG/M2 | DIASTOLIC BLOOD PRESSURE: 99 MMHG | HEIGHT: 75 IN

## 2024-02-24 DIAGNOSIS — N42.83 PROSTATIC CYST: Primary | ICD-10-CM

## 2024-02-24 LAB
ALBUMIN SERPL-MCNC: 4.3 G/DL (ref 3.4–5)
ALBUMIN/GLOB SERPL: 1.5 (ref 0.8–1.7)
ALP SERPL-CCNC: 73 U/L (ref 45–117)
ALT SERPL-CCNC: 35 U/L (ref 16–61)
ANION GAP SERPL CALC-SCNC: 6 MMOL/L (ref 3–18)
APPEARANCE UR: ABNORMAL
APTT PPP: 25.8 SEC (ref 23–36.4)
AST SERPL-CCNC: 16 U/L (ref 10–38)
BACTERIA URNS QL MICRO: NEGATIVE /HPF
BASOPHILS # BLD: 0 K/UL (ref 0–0.1)
BASOPHILS NFR BLD: 1 % (ref 0–2)
BILIRUB SERPL-MCNC: 0.8 MG/DL (ref 0.2–1)
BILIRUB UR QL: NEGATIVE
BUN SERPL-MCNC: 12 MG/DL (ref 7–18)
BUN/CREAT SERPL: 12 (ref 12–20)
CALCIUM SERPL-MCNC: 9.1 MG/DL (ref 8.5–10.1)
CHLORIDE SERPL-SCNC: 107 MMOL/L (ref 100–111)
CO2 SERPL-SCNC: 28 MMOL/L (ref 21–32)
COLOR UR: ABNORMAL
CREAT SERPL-MCNC: 0.98 MG/DL (ref 0.6–1.3)
DIFFERENTIAL METHOD BLD: ABNORMAL
EOSINOPHIL # BLD: 0.1 K/UL (ref 0–0.4)
EOSINOPHIL NFR BLD: 1 % (ref 0–5)
EPITH CASTS URNS QL MICRO: NORMAL /LPF (ref 0–5)
ERYTHROCYTE [DISTWIDTH] IN BLOOD BY AUTOMATED COUNT: 11.9 % (ref 11.6–14.5)
GLOBULIN SER CALC-MCNC: 2.8 G/DL (ref 2–4)
GLUCOSE SERPL-MCNC: 111 MG/DL (ref 74–99)
GLUCOSE UR STRIP.AUTO-MCNC: NEGATIVE MG/DL
HCT VFR BLD AUTO: 46.8 % (ref 36–48)
HGB BLD-MCNC: 16 G/DL (ref 13–16)
HGB UR QL STRIP: ABNORMAL
IMM GRANULOCYTES # BLD AUTO: 0.1 K/UL (ref 0–0.04)
IMM GRANULOCYTES NFR BLD AUTO: 1 % (ref 0–0.5)
INR PPP: 1 (ref 0.9–1.1)
KETONES UR QL STRIP.AUTO: NEGATIVE MG/DL
LEUKOCYTE ESTERASE UR QL STRIP.AUTO: ABNORMAL
LYMPHOCYTES # BLD: 2 K/UL (ref 0.9–3.6)
LYMPHOCYTES NFR BLD: 28 % (ref 21–52)
MCH RBC QN AUTO: 29.3 PG (ref 24–34)
MCHC RBC AUTO-ENTMCNC: 34.2 G/DL (ref 31–37)
MCV RBC AUTO: 85.6 FL (ref 78–100)
MONOCYTES # BLD: 0.4 K/UL (ref 0.05–1.2)
MONOCYTES NFR BLD: 5 % (ref 3–10)
NEUTS SEG # BLD: 4.6 K/UL (ref 1.8–8)
NEUTS SEG NFR BLD: 64 % (ref 40–73)
NITRITE UR QL STRIP.AUTO: NEGATIVE
NRBC # BLD: 0 K/UL (ref 0–0.01)
NRBC BLD-RTO: 0 PER 100 WBC
PH UR STRIP: 6 (ref 5–8)
PLATELET # BLD AUTO: 239 K/UL (ref 135–420)
PMV BLD AUTO: 9.9 FL (ref 9.2–11.8)
POTASSIUM SERPL-SCNC: 4.6 MMOL/L (ref 3.5–5.5)
PROT SERPL-MCNC: 7.1 G/DL (ref 6.4–8.2)
PROT UR STRIP-MCNC: ABNORMAL MG/DL
PROTHROMBIN TIME: 13.1 SEC (ref 11.9–14.7)
RBC # BLD AUTO: 5.47 M/UL (ref 4.35–5.65)
RBC #/AREA URNS HPF: NORMAL /HPF (ref 0–5)
SODIUM SERPL-SCNC: 141 MMOL/L (ref 136–145)
SP GR UR REFRACTOMETRY: 1.01 (ref 1–1.03)
UROBILINOGEN UR QL STRIP.AUTO: 1 EU/DL (ref 0.2–1)
WBC # BLD AUTO: 7.1 K/UL (ref 4.6–13.2)
WBC URNS QL MICRO: NEGATIVE /HPF (ref 0–5)

## 2024-02-24 PROCEDURE — 6360000004 HC RX CONTRAST MEDICATION: Performed by: EMERGENCY MEDICINE

## 2024-02-24 PROCEDURE — 99285 EMERGENCY DEPT VISIT HI MDM: CPT

## 2024-02-24 PROCEDURE — 85730 THROMBOPLASTIN TIME PARTIAL: CPT

## 2024-02-24 PROCEDURE — 87086 URINE CULTURE/COLONY COUNT: CPT

## 2024-02-24 PROCEDURE — 81001 URINALYSIS AUTO W/SCOPE: CPT

## 2024-02-24 PROCEDURE — 74177 CT ABD & PELVIS W/CONTRAST: CPT

## 2024-02-24 PROCEDURE — 80053 COMPREHEN METABOLIC PANEL: CPT

## 2024-02-24 PROCEDURE — 85025 COMPLETE CBC W/AUTO DIFF WBC: CPT

## 2024-02-24 PROCEDURE — 85610 PROTHROMBIN TIME: CPT

## 2024-02-24 RX ORDER — LEVOFLOXACIN 500 MG/1
500 TABLET, FILM COATED ORAL DAILY
Qty: 7 TABLET | Refills: 0 | Status: SHIPPED | OUTPATIENT
Start: 2024-02-24 | End: 2024-03-02

## 2024-02-24 RX ADMIN — IOPAMIDOL 100 ML: 612 INJECTION, SOLUTION INTRAVENOUS at 11:24

## 2024-02-24 NOTE — DISCHARGE INSTRUCTIONS
Declined labs as prescribed.  Follow-up with urology.  Return to the ED for worsening symptoms, inability to void or for other concerns.

## 2024-02-24 NOTE — ED PROVIDER NOTES
[facility] EMERGENCY DEPT  EMERGENCY DEPARTMENT ENCOUNTER    Patient Name: Óscar Montes  MRN: [id number]  YOB: 1965  Provider: Osmany Arboleda MD  PCP: Osmany Berger DO   Urology:   Time/Date of evaluation: 9:48 AM EST on 2/24/24    History of Presenting Illness     Chief Complaint   Patient presents with    Hematuria       History Provided by: Patient   History is limited by: Nothing    HISTORY (Narative):   Óscar Montes is a 58 y.o. male with a PMHX of BPH, prostatitis, sleep apnea  who presents to the emergency department (room 6) by POV C/O hematuria onset last night. Associated sxs include passing blood clots. Patient denies flank pain or any other sxs or complaints.  Patient states that he has never smoked.    Nursing Notes were all reviewed and agreed with or any disagreements were addressed in the HPI.    Past History     PAST MEDICAL HISTORY:  Past Medical History:   Diagnosis Date    Allergic rhinitis     Arthritis     Concussion     high school loc unknown    Enlarged prostate     GERD (gastroesophageal reflux disease)     Sleep apnea      cpap mild    Vertigo     crystals used       PAST SURGICAL HISTORY:  Past Surgical History:   Procedure Laterality Date    COLONOSCOPY N/A 12/15/2020    COLONOSCOPY; POLYPECTOMY performed by YINA Cohen MD at [facility] ENDOSCOPY    KNEE ARTHROSCOPY Right 1993    OTHER SURGICAL HISTORY      moles removed       FAMILY HISTORY:  Family History   Problem Relation Age of Onset    Diabetes Mother     Dementia Father     Stroke Father     Hypertension Father     Cancer Mother     Cancer Father        SOCIAL HISTORY:  Social History     Tobacco Use    Smoking status: Never    Smokeless tobacco: Never   Substance Use Topics    Alcohol use: Yes     Alcohol/week: 3.0 standard drinks of alcohol    Drug use: Never       MEDICATIONS:  No current facility-administered medications for this encounter.     Current Outpatient Medications   Medication Sig Dispense  mouth daily for 7 days  Qty: 7 tablet, Refills: 0                Details   acetaminophen (TYLENOL) 500 MG tablet Take 500 mg by mouth every 6 hours as needed      aspirin 81 MG chewable tablet Take 81 mg by mouth 2 times daily      cetirizine (ZYRTEC) 10 MG tablet Take by mouth daily      vitamin D (CHOLECALCIFEROL) 25 MCG (1000 UT) TABS tablet Take by mouth daily      fexofenadine-pseudoephedrine (ALLEGRA-D 12HR)  MG per extended release tablet Take 1 tablet by mouth      omeprazole (PRILOSEC OTC) 20 MG tablet Take 20 mg by mouth daily as needed             DISCONTINUED MEDICATIONS:  Current Discharge Medication List          PATIENT REFERRED TO:  Follow Up with:  Marley Valdes MD  [address] [facility]  [address]  [place] [address] [phone]    Schedule an appointment as soon as possible for a visit   As soon as possible, For follow up from the Emergency Department    Osmany Berger DO  [address] [facility]  [address]  [place] [address] [phone]    Schedule an appointment as soon as possible for a visit   With your PCP, As soon as possible, For follow up from the Emergency Department    [facility] EMERGENCY DEPT  [address]  [place] [id number] [phone]    As needed, If symptoms worsen      I Osmany Arboleda MD am the primary clinician of record.    Dragon Disclaimer     Please note that this dictation was completed with "[employer] the computer voice recognition software.  Quite often unanticipated grammatical, syntax, homophones, and other interpretive errors are inadvertently transcribed by the computer software.  Please disregard these errors.  Please excuse any errors that have escaped final proofreading.    Osmany Arboleda MD  (Electronically signed)           Osmany Arboleda MD  02/24/24 0334

## 2024-02-24 NOTE — ED TRIAGE NOTES
Pt arrives to ed reporting hematuria that began last night. Pt also states he has frequent urination. Pt is being seen by a urologist.

## 2024-02-26 LAB
BACTERIA SPEC CULT: NORMAL
SERVICE CMNT-IMP: NORMAL

## (undated) DEVICE — PACK PROCEDURE SURG TOT KNEE CUST

## (undated) DEVICE — SINGLE PORT MANIFOLD: Brand: NEPTUNE 2

## (undated) DEVICE — BLADE SAW W13XL90MM 1.19MM PARA

## (undated) DEVICE — DRSG MEPILES BORDER AG 4X12 -- 5/BX

## (undated) DEVICE — GARMENT COMPR M FOR 13IN FT INTMIT SGL BLDR HEM FORC II

## (undated) DEVICE — PREP SKN CHLRAPRP APL 26ML STR --

## (undated) DEVICE — NDL PRT INJ NSAF BLNT 18GX1.5 --

## (undated) DEVICE — GLOVE SURG SZ 75 L12IN FNGR THK79MIL GRN LTX FREE

## (undated) DEVICE — SYR 20ML LL STRL LF --

## (undated) DEVICE — HANDPIECE SET WITH HIGH FLOW TIP AND SUCTION TUBE: Brand: INTERPULSE

## (undated) DEVICE — NDL SPNE QNCKE 18GX3.5IN LF --

## (undated) DEVICE — MAJ-1414 SINGLE USE ADPATER BIOPSY VALV: Brand: SINGLE USE ADAPTOR BIOPSY VALVE

## (undated) DEVICE — SYR 3ML LL TIP 1/10ML GRAD --

## (undated) DEVICE — TUBING, SUCTION, 1/4" X 12', STRAIGHT: Brand: MEDLINE

## (undated) DEVICE — SUT VCRL + 1 36IN CT1 VIO --

## (undated) DEVICE — SYSTEM SKIN CLSR 22CM DERMBND PRINEO

## (undated) DEVICE — SOL INJ SOD CL 0.9% 500ML BG --

## (undated) DEVICE — SOLUTION IV 500ML 0.9% SOD CHL FLX CONT

## (undated) DEVICE — TRNQT TEXT 1X18IN BLU LF DISP -- CONVERT TO ITEM 362165

## (undated) DEVICE — CATH IV SAFE STR 22GX1IN BLU -- PROTECTIV PLUS

## (undated) DEVICE — INTENDED FOR TISSUE SEPARATION, AND OTHER PROCEDURES THAT REQUIRE A SHARP SURGICAL BLADE TO PUNCTURE OR CUT.: Brand: BARD-PARKER ® CARBON RIB-BACK BLADES

## (undated) DEVICE — PIN DRL QUIK HI PERF FOR SIG SYS

## (undated) DEVICE — GLOVE SURG SZ 8 L12IN THK75MIL DK GRN LTX FREE

## (undated) DEVICE — SET ADMIN 16ML TBNG L100IN 2 Y INJ SITE IV PIGGY BK DISP

## (undated) DEVICE — COVER LT HNDL PLAS RIG 2 PER PK

## (undated) DEVICE — THE CANADY HYBRID PLASMA SCALPEL IS AN ELECTROSURGICAL PLASMA SCALPEL THAT USES AN 85MM BENDABLE PADDLE BLADE TIP. THE ELECTROSURGICAL PLASMA SCALPEL IS USED TO SIMULTANEOUSLY CUT AND COAGULATE BIOLOGICAL TISSUE.: Brand: CANADY HYBRID PLASMA PADDLE BLADE

## (undated) DEVICE — IMMOBILIZER KNEE UNIV L19IN FOR 12-24IN THGH FOAM T BAR

## (undated) DEVICE — Device

## (undated) DEVICE — GLOVE ORANGE PI 8   MSG9080

## (undated) DEVICE — NEEDLE HYPO 22GA L1.5IN BLK S STL HUB POLYPR SHLD REG BVL

## (undated) DEVICE — SOL IRRIGATION INJ NACL 0.9% 500ML BTL

## (undated) DEVICE — CANNULA CUSH AD W/ 14FT TBG

## (undated) DEVICE — NDL FLTR TIP 5 MIC 18GX1.5IN --

## (undated) DEVICE — WRISTBAND ID AD W2.5XL9.5CM RED VYN ADH CLSR UNI-PRINT

## (undated) DEVICE — SUT VCRL + 2-0 36IN CT1 UD --

## (undated) DEVICE — KENDALL RADIOLUCENT FOAM MONITORING ELECTRODE RECTANGULAR SHAPE: Brand: KENDALL

## (undated) DEVICE — SNARE POLYP SM W13MMXL240CM SHTH DIA2.4MM OVL FLX DISP

## (undated) DEVICE — SUTURE STRATAFIX SYMMETRIC PDS + 1 SGL ARMED CT 18 IN LEN SXPP1A405

## (undated) DEVICE — SPONGE GZ W4XL4IN COT 12 PLY TYP VII WVN C FLD DSGN

## (undated) DEVICE — SPONGE GZ W4XL4IN RAYON POLY 4 PLY NONWOVEN FASTER WICKING

## (undated) DEVICE — SYRINGE 50ML E/T

## (undated) DEVICE — SOLUTION IV 250ML 0.9% SOD CHL CLR INJ FLX BG CONT PRT CLSR

## (undated) DEVICE — 3 BONE CEMENT MIXER: Brand: MIXEVAC

## (undated) DEVICE — TRAP SPEC COLL POLYP POLYSTYR --

## (undated) DEVICE — GARMENT,MEDLINE,DVT,INT,CALF,MED, GEN2: Brand: MEDLINE

## (undated) DEVICE — BLADE SAW 1.19X20X90 MM FOR LG BNE

## (undated) DEVICE — GOWN,SIRUS,NONRNF,SETINSLV,XL,20/CS: Brand: MEDLINE

## (undated) DEVICE — SYR 5ML 1/5 GRAD LL NSAF LF --

## (undated) DEVICE — CATH SUC CTRL PRT TRIFLO 14FR --